# Patient Record
Sex: MALE | Race: WHITE | NOT HISPANIC OR LATINO | URBAN - METROPOLITAN AREA
[De-identification: names, ages, dates, MRNs, and addresses within clinical notes are randomized per-mention and may not be internally consistent; named-entity substitution may affect disease eponyms.]

---

## 2023-04-11 ENCOUNTER — TELEPHONE (OUTPATIENT)
Dept: PRIMARY CARE | Facility: CLINIC | Age: 83
End: 2023-04-11
Payer: MEDICARE

## 2023-04-11 DIAGNOSIS — E78.5 HYPERLIPIDEMIA, UNSPECIFIED HYPERLIPIDEMIA TYPE: Primary | ICD-10-CM

## 2023-04-11 DIAGNOSIS — E79.0 HYPERURICEMIA: ICD-10-CM

## 2023-04-11 RX ORDER — OXYBUTYNIN CHLORIDE 10 MG/1
1 TABLET, EXTENDED RELEASE ORAL DAILY
COMMUNITY
Start: 2022-07-14 | End: 2023-07-20 | Stop reason: SDUPTHER

## 2023-04-11 RX ORDER — BENZONATATE 200 MG/1
CAPSULE ORAL
COMMUNITY
Start: 2022-11-30 | End: 2023-07-20 | Stop reason: ALTCHOICE

## 2023-04-11 RX ORDER — FAMOTIDINE 20 MG/1
1 TABLET, FILM COATED ORAL DAILY
COMMUNITY
Start: 2021-03-22 | End: 2023-04-20 | Stop reason: SDUPTHER

## 2023-04-11 RX ORDER — GEMFIBROZIL 600 MG/1
TABLET, FILM COATED ORAL 2 TIMES DAILY
COMMUNITY
Start: 2017-10-18 | End: 2023-04-20 | Stop reason: SDUPTHER

## 2023-04-11 RX ORDER — KETOCONAZOLE 20 MG/ML
SHAMPOO, SUSPENSION TOPICAL
COMMUNITY
Start: 2021-01-11 | End: 2024-01-03 | Stop reason: SDUPTHER

## 2023-04-11 RX ORDER — ALBUTEROL SULFATE 90 UG/1
AEROSOL, METERED RESPIRATORY (INHALATION)
COMMUNITY
Start: 2022-11-30 | End: 2023-07-20 | Stop reason: ALTCHOICE

## 2023-04-11 RX ORDER — ALLOPURINOL 100 MG/1
1 TABLET ORAL DAILY
COMMUNITY
Start: 2016-08-16 | End: 2023-07-20 | Stop reason: SDUPTHER

## 2023-04-11 RX ORDER — POTASSIUM CITRATE 10 MEQ/1
1 TABLET, EXTENDED RELEASE ORAL 2 TIMES DAILY
COMMUNITY
Start: 2019-04-30 | End: 2023-04-20 | Stop reason: SDUPTHER

## 2023-04-11 RX ORDER — CALCIPOTRIENE 50 UG/G
CREAM TOPICAL
COMMUNITY
Start: 2022-06-08 | End: 2024-01-03 | Stop reason: SDUPTHER

## 2023-04-11 RX ORDER — GABAPENTIN 300 MG/1
1 CAPSULE ORAL 3 TIMES DAILY
COMMUNITY
Start: 2021-03-15 | End: 2023-04-20 | Stop reason: SDUPTHER

## 2023-04-11 RX ORDER — FLUTICASONE PROPIONATE 50 MCG
SPRAY, SUSPENSION (ML) NASAL
COMMUNITY
Start: 2020-02-03 | End: 2023-07-20 | Stop reason: ALTCHOICE

## 2023-04-11 NOTE — TELEPHONE ENCOUNTER
Patient coming in for a an appt coming up on 4/20 and would like bloodwork ordered.      Order in chart

## 2023-04-17 ENCOUNTER — LAB (OUTPATIENT)
Dept: LAB | Facility: LAB | Age: 83
End: 2023-04-17
Payer: MEDICARE

## 2023-04-17 DIAGNOSIS — E79.0 HYPERURICEMIA: ICD-10-CM

## 2023-04-17 DIAGNOSIS — E78.5 HYPERLIPIDEMIA, UNSPECIFIED HYPERLIPIDEMIA TYPE: ICD-10-CM

## 2023-04-17 LAB
ALANINE AMINOTRANSFERASE (SGPT) (U/L) IN SER/PLAS: 14 U/L (ref 10–52)
ALBUMIN (G/DL) IN SER/PLAS: 4.3 G/DL (ref 3.4–5)
ALKALINE PHOSPHATASE (U/L) IN SER/PLAS: 95 U/L (ref 33–136)
ANION GAP IN SER/PLAS: 14 MMOL/L (ref 10–20)
ASPARTATE AMINOTRANSFERASE (SGOT) (U/L) IN SER/PLAS: 15 U/L (ref 9–39)
BASOPHILS (10*3/UL) IN BLOOD BY AUTOMATED COUNT: 0.04 X10E9/L (ref 0–0.1)
BASOPHILS/100 LEUKOCYTES IN BLOOD BY AUTOMATED COUNT: 0.5 % (ref 0–2)
BILIRUBIN TOTAL (MG/DL) IN SER/PLAS: 0.6 MG/DL (ref 0–1.2)
CALCIUM (MG/DL) IN SER/PLAS: 9.5 MG/DL (ref 8.6–10.3)
CARBON DIOXIDE, TOTAL (MMOL/L) IN SER/PLAS: 23 MMOL/L (ref 21–32)
CHLORIDE (MMOL/L) IN SER/PLAS: 105 MMOL/L (ref 98–107)
CHOLESTEROL (MG/DL) IN SER/PLAS: 149 MG/DL (ref 0–199)
CHOLESTEROL IN HDL (MG/DL) IN SER/PLAS: 30.4 MG/DL
CHOLESTEROL/HDL RATIO: 4.9
CREATININE (MG/DL) IN SER/PLAS: 1.45 MG/DL (ref 0.5–1.3)
EOSINOPHILS (10*3/UL) IN BLOOD BY AUTOMATED COUNT: 0.28 X10E9/L (ref 0–0.4)
EOSINOPHILS/100 LEUKOCYTES IN BLOOD BY AUTOMATED COUNT: 3.3 % (ref 0–6)
ERYTHROCYTE DISTRIBUTION WIDTH (RATIO) BY AUTOMATED COUNT: 13.7 % (ref 11.5–14.5)
ERYTHROCYTE MEAN CORPUSCULAR HEMOGLOBIN CONCENTRATION (G/DL) BY AUTOMATED: 33.2 G/DL (ref 32–36)
ERYTHROCYTE MEAN CORPUSCULAR VOLUME (FL) BY AUTOMATED COUNT: 89 FL (ref 80–100)
ERYTHROCYTES (10*6/UL) IN BLOOD BY AUTOMATED COUNT: 4.82 X10E12/L (ref 4.5–5.9)
GFR MALE: 48 ML/MIN/1.73M2
GLUCOSE (MG/DL) IN SER/PLAS: 112 MG/DL (ref 74–99)
HEMATOCRIT (%) IN BLOOD BY AUTOMATED COUNT: 43.1 % (ref 41–52)
HEMOGLOBIN (G/DL) IN BLOOD: 14.3 G/DL (ref 13.5–17.5)
IMMATURE GRANULOCYTES/100 LEUKOCYTES IN BLOOD BY AUTOMATED COUNT: 0.4 % (ref 0–0.9)
LDL: 72 MG/DL (ref 0–99)
LEUKOCYTES (10*3/UL) IN BLOOD BY AUTOMATED COUNT: 8.5 X10E9/L (ref 4.4–11.3)
LYMPHOCYTES (10*3/UL) IN BLOOD BY AUTOMATED COUNT: 1.24 X10E9/L (ref 0.8–3)
LYMPHOCYTES/100 LEUKOCYTES IN BLOOD BY AUTOMATED COUNT: 14.6 % (ref 13–44)
MONOCYTES (10*3/UL) IN BLOOD BY AUTOMATED COUNT: 0.65 X10E9/L (ref 0.05–0.8)
MONOCYTES/100 LEUKOCYTES IN BLOOD BY AUTOMATED COUNT: 7.7 % (ref 2–10)
NEUTROPHILS (10*3/UL) IN BLOOD BY AUTOMATED COUNT: 6.24 X10E9/L (ref 1.6–5.5)
NEUTROPHILS/100 LEUKOCYTES IN BLOOD BY AUTOMATED COUNT: 73.5 % (ref 40–80)
NON HDL CHOLESTEROL: 119 MG/DL
PLATELETS (10*3/UL) IN BLOOD AUTOMATED COUNT: 256 X10E9/L (ref 150–450)
POTASSIUM (MMOL/L) IN SER/PLAS: 3.8 MMOL/L (ref 3.5–5.3)
PROTEIN TOTAL: 7.5 G/DL (ref 6.4–8.2)
SODIUM (MMOL/L) IN SER/PLAS: 138 MMOL/L (ref 136–145)
TRIGLYCERIDE (MG/DL) IN SER/PLAS: 232 MG/DL (ref 0–149)
URATE (MG/DL) IN SER/PLAS: 6.5 MG/DL (ref 4–7.5)
UREA NITROGEN (MG/DL) IN SER/PLAS: 34 MG/DL (ref 6–23)
VLDL: 46 MG/DL (ref 0–40)

## 2023-04-17 PROCEDURE — 84550 ASSAY OF BLOOD/URIC ACID: CPT

## 2023-04-17 PROCEDURE — 36415 COLL VENOUS BLD VENIPUNCTURE: CPT

## 2023-04-17 PROCEDURE — 85025 COMPLETE CBC W/AUTO DIFF WBC: CPT

## 2023-04-17 PROCEDURE — 80061 LIPID PANEL: CPT

## 2023-04-17 PROCEDURE — 80053 COMPREHEN METABOLIC PANEL: CPT

## 2023-04-20 ENCOUNTER — OFFICE VISIT (OUTPATIENT)
Dept: PRIMARY CARE | Facility: CLINIC | Age: 83
End: 2023-04-20
Payer: MEDICARE

## 2023-04-20 VITALS
HEART RATE: 73 BPM | SYSTOLIC BLOOD PRESSURE: 138 MMHG | DIASTOLIC BLOOD PRESSURE: 73 MMHG | OXYGEN SATURATION: 82 % | BODY MASS INDEX: 26.85 KG/M2 | TEMPERATURE: 97.5 F | WEIGHT: 171.4 LBS

## 2023-04-20 DIAGNOSIS — E11.65 TYPE 2 DIABETES MELLITUS WITH HYPERGLYCEMIA, WITHOUT LONG-TERM CURRENT USE OF INSULIN (MULTI): ICD-10-CM

## 2023-04-20 DIAGNOSIS — K21.9 GASTROESOPHAGEAL REFLUX DISEASE WITHOUT ESOPHAGITIS: ICD-10-CM

## 2023-04-20 DIAGNOSIS — N20.0 KIDNEY STONES: ICD-10-CM

## 2023-04-20 DIAGNOSIS — C61 MALIGNANT NEOPLASM OF PROSTATE (MULTI): ICD-10-CM

## 2023-04-20 DIAGNOSIS — E78.5 HYPERLIPIDEMIA, UNSPECIFIED HYPERLIPIDEMIA TYPE: ICD-10-CM

## 2023-04-20 DIAGNOSIS — Z00.00 ROUTINE GENERAL MEDICAL EXAMINATION AT HEALTH CARE FACILITY: Primary | ICD-10-CM

## 2023-04-20 DIAGNOSIS — N18.5 CHRONIC KIDNEY DISEASE, STAGE 5 (MULTI): ICD-10-CM

## 2023-04-20 DIAGNOSIS — G63 POLYNEUROPATHY ASSOCIATED WITH UNDERLYING DISEASE (MULTI): ICD-10-CM

## 2023-04-20 DIAGNOSIS — Z00.00 MEDICARE ANNUAL WELLNESS VISIT, SUBSEQUENT: ICD-10-CM

## 2023-04-20 PROBLEM — N18.32 CHRONIC KIDNEY DISEASE, STAGE 3B (MULTI): Status: ACTIVE | Noted: 2023-04-20

## 2023-04-20 PROBLEM — G62.9 PERIPHERAL NEUROPATHY: Status: ACTIVE | Noted: 2023-04-20

## 2023-04-20 PROBLEM — N52.35 ERECTILE DYSFUNCTION FOLLOWING RADIATION THERAPY: Status: ACTIVE | Noted: 2023-04-20

## 2023-04-20 PROBLEM — I10 ESSENTIAL HYPERTENSION: Status: ACTIVE | Noted: 2023-04-20

## 2023-04-20 PROBLEM — E78.1 ESSENTIAL HYPERTRIGLYCERIDEMIA: Status: ACTIVE | Noted: 2023-04-20

## 2023-04-20 PROBLEM — M10.9 GOUT: Status: ACTIVE | Noted: 2023-04-20

## 2023-04-20 PROCEDURE — 99214 OFFICE O/P EST MOD 30 MIN: CPT | Performed by: FAMILY MEDICINE

## 2023-04-20 PROCEDURE — G0439 PPPS, SUBSEQ VISIT: HCPCS | Performed by: FAMILY MEDICINE

## 2023-04-20 PROCEDURE — 1036F TOBACCO NON-USER: CPT | Performed by: FAMILY MEDICINE

## 2023-04-20 PROCEDURE — 1159F MED LIST DOCD IN RCRD: CPT | Performed by: FAMILY MEDICINE

## 2023-04-20 PROCEDURE — 1157F ADVNC CARE PLAN IN RCRD: CPT | Performed by: FAMILY MEDICINE

## 2023-04-20 PROCEDURE — 3075F SYST BP GE 130 - 139MM HG: CPT | Performed by: FAMILY MEDICINE

## 2023-04-20 PROCEDURE — 3078F DIAST BP <80 MM HG: CPT | Performed by: FAMILY MEDICINE

## 2023-04-20 PROCEDURE — 1170F FXNL STATUS ASSESSED: CPT | Performed by: FAMILY MEDICINE

## 2023-04-20 PROCEDURE — 1160F RVW MEDS BY RX/DR IN RCRD: CPT | Performed by: FAMILY MEDICINE

## 2023-04-20 RX ORDER — GEMFIBROZIL 600 MG/1
600 TABLET, FILM COATED ORAL 2 TIMES DAILY
Qty: 180 TABLET | Refills: 1 | Status: SHIPPED | OUTPATIENT
Start: 2023-04-20 | End: 2023-07-20 | Stop reason: ALTCHOICE

## 2023-04-20 RX ORDER — GABAPENTIN 300 MG/1
CAPSULE ORAL
Qty: 120 CAPSULE | Refills: 2 | Status: SHIPPED | OUTPATIENT
Start: 2023-04-20 | End: 2023-07-20 | Stop reason: SDUPTHER

## 2023-04-20 RX ORDER — BLOOD-GLUCOSE METER
EACH MISCELLANEOUS
Qty: 100 STRIP | Refills: 3 | Status: SHIPPED | OUTPATIENT
Start: 2023-04-20 | End: 2023-07-20 | Stop reason: SDUPTHER

## 2023-04-20 RX ORDER — FAMOTIDINE 20 MG/1
20 TABLET, FILM COATED ORAL DAILY
Qty: 90 TABLET | Refills: 1 | Status: SHIPPED | OUTPATIENT
Start: 2023-04-20 | End: 2023-07-20 | Stop reason: SDUPTHER

## 2023-04-20 RX ORDER — BLOOD-GLUCOSE CONTROL, NORMAL
EACH MISCELLANEOUS
Qty: 100 EACH | Refills: 3 | Status: SHIPPED | OUTPATIENT
Start: 2023-04-20 | End: 2023-10-05 | Stop reason: SDUPTHER

## 2023-04-20 RX ORDER — POTASSIUM CITRATE 10 MEQ/1
10 TABLET, EXTENDED RELEASE ORAL 2 TIMES DAILY
Qty: 180 TABLET | Refills: 1 | Status: SHIPPED | OUTPATIENT
Start: 2023-04-20 | End: 2023-07-20 | Stop reason: SDUPTHER

## 2023-04-20 RX ORDER — BLOOD-GLUCOSE METER
EACH MISCELLANEOUS
COMMUNITY
Start: 2023-02-24 | End: 2023-04-20 | Stop reason: SDUPTHER

## 2023-04-20 RX ORDER — LANCETS 33 GAUGE
EACH MISCELLANEOUS DAILY
COMMUNITY
Start: 2023-02-24 | End: 2023-04-20 | Stop reason: SDUPTHER

## 2023-04-20 RX ORDER — LANCETS 30 GAUGE
EACH MISCELLANEOUS
COMMUNITY
Start: 2023-02-24

## 2023-04-20 ASSESSMENT — PATIENT HEALTH QUESTIONNAIRE - PHQ9
1. LITTLE INTEREST OR PLEASURE IN DOING THINGS: NOT AT ALL
2. FEELING DOWN, DEPRESSED OR HOPELESS: NOT AT ALL
SUM OF ALL RESPONSES TO PHQ9 QUESTIONS 1 AND 2: 0

## 2023-04-20 ASSESSMENT — ACTIVITIES OF DAILY LIVING (ADL)
TAKING_MEDICATION: INDEPENDENT
BATHING: INDEPENDENT
MANAGING_FINANCES: INDEPENDENT
GROCERY_SHOPPING: INDEPENDENT
DOING_HOUSEWORK: INDEPENDENT
DRESSING: INDEPENDENT

## 2023-04-20 ASSESSMENT — ENCOUNTER SYMPTOMS
DEPRESSION: 0
LOSS OF SENSATION IN FEET: 0
OCCASIONAL FEELINGS OF UNSTEADINESS: 0

## 2023-04-20 NOTE — PROGRESS NOTES
Subjective   Reason for Visit: Chilo Gibbs is an 83 y.o. male here for a Medicare Wellness visit.     Past Medical, Surgical, and Family History reviewed and updated in chart.    Reviewed all medications by prescribing practitioner or clinical pharmacist (such as prescriptions, OTCs, herbal therapies and supplements) and documented in the medical record.  Medicare Wellness Billing Compliance Satisfied    Review all medications by prescribing practitioner or clinical pharmacist (such as prescriptions, OTCs, herbal therapies and supplements) documented in the medical record    Past Medical, Surgical, and Family History reviewed and updated in chart    Tobacco Use Reviewed    Alcohol Use Reviewed    Illicit Drug Use Reviewed    PHQ2/9    Falls in Last Year Reviewed    Home Safety Risk Factors Reviewed    Cognitive Impairment Reviewed    Patient Self Assessment and Health Status    Current Diet Reviewed    Exercise Frequency    ADL - Hearing Impairment    ADL - Bathing    ADL - Dressing    ADL - Walks in Home    IADL - Managing Finances    IADL - Grocery Shopping    IADL - Taking Medications    IADL - Doing Housework      HPI  He takes his gemfibrozil at lunch which resolved previous issues with his stomach and bowel movements. He also takes it before dinner. His TG are the best they have been.  Wife is with him, states they are eating a lot of produce and much less processed foods.  Helping BS and TG    He requests refills on famotidine, gabapentin, gemfibrozil and potassium citrate as they are controlling his symptoms well. He does not voice any side effects.     He has been losing weight. He tests his blood sugar for FBS and after dinner. He has been working with a dietician. He has significantly reduced sugar intake. BS has improved     He complains of neuropathy at night. He notes pain in his feet. He notes the Gabapentin provides minimal relief. Will increase gabapentin to 4 per day. He has  a history of gout and uric acid good range. He had plates put in each big toe. He wonders if this has contributed to his nerve pain.     He requests a renewal of his disability placard.     Patient Care Team:  Sandra Daniel DO as PCP - General  Sandra Daniel DO as PCP - [employer] PCP     Review of Systems    Objective   Vitals:  There were no vitals taken for this visit.      Physical Exam    Assessment/Plan   Problem List Items Addressed This Visit      Reviewed Labs from 04/17/2023, which  revealed blood count WNL.  Chemistries in good range with FBS of 112. Total Cholesterol 149, HDL 30, LDL 72, Triglycerides 232    Uric Acid  6.5        Genitourinary    Chronic kidney disease, stage 5 (CMS/[facility])    Malignant neoplasm of prostate (CMS/[facility])     Other Visit Diagnoses       Routine [facility] medical examination at [facility]    -  Primary Medicare annual wellness visit, subsequent            GERD  Continue on Famotidine 20 mg daily. Prescription sent to pharmacy.     Hyperlipidemia  Continue on gemfibrozil 600 mg twice daily. Prescription sent to pharmacy.     Back Pain and Neuropathy.   Increase  Gabapentin 300 mg to four times a day. Prescription sent to pharmacy,   OARRS checked. Risk and benefit ratio assessed. No Suspicious activity.     Diabetes   Monitor blood sugar once daily. Prescription for test strips and lancet sent to pharmacy.  Continue to work on diet and exercise.      Health Maintenance  Provided order for repeat labs in 3 months. Will call with results.   Provided disability placard for neuropathy.     Medication refill.   Continue on Potassium Citrate 10 meq daily. Prescription sent to pharmacy.     Follow up in 3-4 months, with labs prior, unless something is needed sooner.      Scribe Attestation  By signing my name below, IAntoinette Scribe   attest that this documentation has been prepared under the direction and in the presence of Sandra  DO María.

## 2023-06-09 DIAGNOSIS — I10 ESSENTIAL HYPERTENSION: Primary | ICD-10-CM

## 2023-06-09 RX ORDER — AMLODIPINE BESYLATE 5 MG/1
5 TABLET ORAL DAILY
Qty: 90 TABLET | Refills: 0 | Status: SHIPPED | OUTPATIENT
Start: 2023-06-09 | End: 2023-07-20 | Stop reason: SDUPTHER

## 2023-06-09 RX ORDER — AMLODIPINE BESYLATE 5 MG/1
1 TABLET ORAL DAILY
COMMUNITY
Start: 2019-04-29 | End: 2023-06-09 | Stop reason: SDUPTHER

## 2023-07-05 ENCOUNTER — TELEPHONE (OUTPATIENT)
Dept: PRIMARY CARE | Facility: CLINIC | Age: 83
End: 2023-07-05
Payer: MEDICARE

## 2023-07-06 ENCOUNTER — LAB (OUTPATIENT)
Dept: LAB | Facility: LAB | Age: 83
End: 2023-07-06
Payer: MEDICARE

## 2023-07-06 DIAGNOSIS — E78.5 HYPERLIPIDEMIA, UNSPECIFIED HYPERLIPIDEMIA TYPE: ICD-10-CM

## 2023-07-06 DIAGNOSIS — E11.65 TYPE 2 DIABETES MELLITUS WITH HYPERGLYCEMIA, WITHOUT LONG-TERM CURRENT USE OF INSULIN (MULTI): ICD-10-CM

## 2023-07-06 DIAGNOSIS — C61 MALIGNANT NEOPLASM OF PROSTATE (MULTI): ICD-10-CM

## 2023-07-06 LAB
ALANINE AMINOTRANSFERASE (SGPT) (U/L) IN SER/PLAS: 16 U/L (ref 10–52)
ALBUMIN (G/DL) IN SER/PLAS: 4.6 G/DL (ref 3.4–5)
ALKALINE PHOSPHATASE (U/L) IN SER/PLAS: 117 U/L (ref 33–136)
ANION GAP IN SER/PLAS: 14 MMOL/L (ref 10–20)
ASPARTATE AMINOTRANSFERASE (SGOT) (U/L) IN SER/PLAS: 17 U/L (ref 9–39)
BILIRUBIN TOTAL (MG/DL) IN SER/PLAS: 0.7 MG/DL (ref 0–1.2)
CALCIUM (MG/DL) IN SER/PLAS: 10 MG/DL (ref 8.6–10.3)
CARBON DIOXIDE, TOTAL (MMOL/L) IN SER/PLAS: 25 MMOL/L (ref 21–32)
CHLORIDE (MMOL/L) IN SER/PLAS: 104 MMOL/L (ref 98–107)
CHOLESTEROL (MG/DL) IN SER/PLAS: 169 MG/DL (ref 0–199)
CHOLESTEROL IN HDL (MG/DL) IN SER/PLAS: 35.4 MG/DL
CHOLESTEROL/HDL RATIO: 4.8
CREATININE (MG/DL) IN SER/PLAS: 1.63 MG/DL (ref 0.5–1.3)
ESTIMATED AVERAGE GLUCOSE FOR HBA1C: 134 MG/DL
GFR MALE: 42 ML/MIN/1.73M2
GLUCOSE (MG/DL) IN SER/PLAS: 97 MG/DL (ref 74–99)
HEMOGLOBIN A1C/HEMOGLOBIN TOTAL IN BLOOD: 6.3 %
LDL: 98 MG/DL (ref 0–99)
POTASSIUM (MMOL/L) IN SER/PLAS: 4 MMOL/L (ref 3.5–5.3)
PROSTATE SPECIFIC ANTIGEN,SCREEN: <0.01 NG/ML (ref 0–4)
PROTEIN TOTAL: 7.7 G/DL (ref 6.4–8.2)
SODIUM (MMOL/L) IN SER/PLAS: 139 MMOL/L (ref 136–145)
TRIGLYCERIDE (MG/DL) IN SER/PLAS: 178 MG/DL (ref 0–149)
UREA NITROGEN (MG/DL) IN SER/PLAS: 40 MG/DL (ref 6–23)
VLDL: 36 MG/DL (ref 0–40)

## 2023-07-06 PROCEDURE — 84153 ASSAY OF PSA TOTAL: CPT

## 2023-07-06 PROCEDURE — 80061 LIPID PANEL: CPT

## 2023-07-06 PROCEDURE — 80053 COMPREHEN METABOLIC PANEL: CPT

## 2023-07-06 PROCEDURE — 83036 HEMOGLOBIN GLYCOSYLATED A1C: CPT

## 2023-07-06 PROCEDURE — 36415 COLL VENOUS BLD VENIPUNCTURE: CPT

## 2023-07-20 ENCOUNTER — OFFICE VISIT (OUTPATIENT)
Dept: PRIMARY CARE | Facility: CLINIC | Age: 83
End: 2023-07-20
Payer: MEDICARE

## 2023-07-20 VITALS
RESPIRATION RATE: 16 BRPM | HEART RATE: 63 BPM | WEIGHT: 165 LBS | OXYGEN SATURATION: 92 % | HEIGHT: 67 IN | DIASTOLIC BLOOD PRESSURE: 75 MMHG | BODY MASS INDEX: 25.9 KG/M2 | SYSTOLIC BLOOD PRESSURE: 135 MMHG | TEMPERATURE: 97.2 F

## 2023-07-20 DIAGNOSIS — N13.8 BPH WITH OBSTRUCTION/LOWER URINARY TRACT SYMPTOMS: ICD-10-CM

## 2023-07-20 DIAGNOSIS — M1A.09X0 CHRONIC GOUT OF MULTIPLE SITES, UNSPECIFIED CAUSE: Primary | ICD-10-CM

## 2023-07-20 DIAGNOSIS — N18.2 DIABETES MELLITUS DUE TO UNDERLYING CONDITION WITH STAGE 2 CHRONIC KIDNEY DISEASE, WITHOUT LONG-TERM CURRENT USE OF INSULIN (MULTI): ICD-10-CM

## 2023-07-20 DIAGNOSIS — G63 POLYNEUROPATHY ASSOCIATED WITH UNDERLYING DISEASE (MULTI): ICD-10-CM

## 2023-07-20 DIAGNOSIS — E78.1 ESSENTIAL HYPERTRIGLYCERIDEMIA: ICD-10-CM

## 2023-07-20 DIAGNOSIS — K21.9 GASTROESOPHAGEAL REFLUX DISEASE WITHOUT ESOPHAGITIS: ICD-10-CM

## 2023-07-20 DIAGNOSIS — N40.1 BPH WITH OBSTRUCTION/LOWER URINARY TRACT SYMPTOMS: ICD-10-CM

## 2023-07-20 DIAGNOSIS — N18.32 CHRONIC KIDNEY DISEASE, STAGE 3B (MULTI): ICD-10-CM

## 2023-07-20 DIAGNOSIS — E11.65 TYPE 2 DIABETES MELLITUS WITH HYPERGLYCEMIA, WITHOUT LONG-TERM CURRENT USE OF INSULIN (MULTI): ICD-10-CM

## 2023-07-20 DIAGNOSIS — I10 ESSENTIAL HYPERTENSION: ICD-10-CM

## 2023-07-20 DIAGNOSIS — E08.22 DIABETES MELLITUS DUE TO UNDERLYING CONDITION WITH STAGE 2 CHRONIC KIDNEY DISEASE, WITHOUT LONG-TERM CURRENT USE OF INSULIN (MULTI): ICD-10-CM

## 2023-07-20 DIAGNOSIS — N20.0 KIDNEY STONES: ICD-10-CM

## 2023-07-20 DIAGNOSIS — C61 MALIGNANT NEOPLASM OF PROSTATE (MULTI): ICD-10-CM

## 2023-07-20 DIAGNOSIS — L21.9 SEBORRHEA: ICD-10-CM

## 2023-07-20 PROBLEM — M25.571 ANKLE PAIN, RIGHT: Status: ACTIVE | Noted: 2023-07-20

## 2023-07-20 PROBLEM — M67.819 TENDINOSIS OF ROTATOR CUFF: Status: ACTIVE | Noted: 2023-07-20

## 2023-07-20 PROBLEM — N28.9 RENAL INSUFFICIENCY: Status: ACTIVE | Noted: 2023-07-20

## 2023-07-20 PROBLEM — L40.9 PSORIASIS: Status: ACTIVE | Noted: 2023-07-20

## 2023-07-20 PROBLEM — R31.29 MICROSCOPIC HEMATURIA: Status: ACTIVE | Noted: 2023-07-20

## 2023-07-20 PROBLEM — M1A.00X1: Status: ACTIVE | Noted: 2023-07-20

## 2023-07-20 PROBLEM — R14.0 ABDOMINAL BLOATING: Status: ACTIVE | Noted: 2023-07-20

## 2023-07-20 PROBLEM — M48.062 SPINAL STENOSIS OF LUMBAR REGION WITH NEUROGENIC CLAUDICATION: Status: ACTIVE | Noted: 2023-07-20

## 2023-07-20 PROBLEM — M19.279 SECONDARY LOCALIZED OSTEOARTHROSIS OF ANKLE AND FOOT: Status: ACTIVE | Noted: 2023-07-20

## 2023-07-20 PROBLEM — M19.071 ARTHRITIS OF RIGHT SUBTALAR JOINT: Status: ACTIVE | Noted: 2023-07-20

## 2023-07-20 PROBLEM — R05.1 ACUTE COUGH: Status: ACTIVE | Noted: 2023-07-20

## 2023-07-20 PROBLEM — R19.5 LOOSE STOOLS: Status: ACTIVE | Noted: 2023-07-20

## 2023-07-20 PROBLEM — R79.89 ELEVATED PARATHYROID HORMONE: Status: ACTIVE | Noted: 2023-07-20

## 2023-07-20 PROBLEM — M25.519 SHOULDER PAIN: Status: ACTIVE | Noted: 2023-07-20

## 2023-07-20 PROBLEM — J20.9 ACUTE BRONCHITIS: Status: ACTIVE | Noted: 2023-07-20

## 2023-07-20 PROBLEM — R19.4 CHANGE IN BOWEL HABIT: Status: ACTIVE | Noted: 2023-07-20

## 2023-07-20 PROBLEM — M17.0 ARTHRITIS OF BOTH KNEES: Status: ACTIVE | Noted: 2023-07-20

## 2023-07-20 PROBLEM — R39.15 URINARY URGENCY: Status: ACTIVE | Noted: 2023-07-20

## 2023-07-20 PROBLEM — H90.3 SENSORINEURAL HEARING LOSS, BILATERAL: Status: ACTIVE | Noted: 2023-07-20

## 2023-07-20 PROBLEM — R10.32 LEFT LOWER QUADRANT ABDOMINAL PAIN: Status: ACTIVE | Noted: 2023-07-20

## 2023-07-20 PROCEDURE — 1157F ADVNC CARE PLAN IN RCRD: CPT | Performed by: FAMILY MEDICINE

## 2023-07-20 PROCEDURE — 1126F AMNT PAIN NOTED NONE PRSNT: CPT | Performed by: FAMILY MEDICINE

## 2023-07-20 PROCEDURE — 1160F RVW MEDS BY RX/DR IN RCRD: CPT | Performed by: FAMILY MEDICINE

## 2023-07-20 PROCEDURE — 3075F SYST BP GE 130 - 139MM HG: CPT | Performed by: FAMILY MEDICINE

## 2023-07-20 PROCEDURE — 1159F MED LIST DOCD IN RCRD: CPT | Performed by: FAMILY MEDICINE

## 2023-07-20 PROCEDURE — 3078F DIAST BP <80 MM HG: CPT | Performed by: FAMILY MEDICINE

## 2023-07-20 PROCEDURE — 99214 OFFICE O/P EST MOD 30 MIN: CPT | Performed by: FAMILY MEDICINE

## 2023-07-20 PROCEDURE — 1036F TOBACCO NON-USER: CPT | Performed by: FAMILY MEDICINE

## 2023-07-20 RX ORDER — POTASSIUM CITRATE 10 MEQ/1
10 TABLET, EXTENDED RELEASE ORAL 2 TIMES DAILY
Qty: 180 TABLET | Refills: 1 | Status: SHIPPED | OUTPATIENT
Start: 2023-07-20 | End: 2023-10-05 | Stop reason: SDUPTHER

## 2023-07-20 RX ORDER — OXYBUTYNIN CHLORIDE 10 MG/1
10 TABLET, EXTENDED RELEASE ORAL DAILY
Qty: 90 TABLET | Refills: 1 | Status: SHIPPED | OUTPATIENT
Start: 2023-07-20 | End: 2023-10-05 | Stop reason: SDUPTHER

## 2023-07-20 RX ORDER — GABAPENTIN 300 MG/1
CAPSULE ORAL
Qty: 120 CAPSULE | Refills: 2 | Status: SHIPPED | OUTPATIENT
Start: 2023-07-20 | End: 2023-10-05 | Stop reason: SDUPTHER

## 2023-07-20 RX ORDER — BLOOD-GLUCOSE METER
EACH MISCELLANEOUS
Qty: 100 STRIP | Refills: 3 | Status: SHIPPED | OUTPATIENT
Start: 2023-07-20 | End: 2023-10-05 | Stop reason: SDUPTHER

## 2023-07-20 RX ORDER — TAMSULOSIN HYDROCHLORIDE 0.4 MG/1
1 CAPSULE ORAL NIGHTLY
Qty: 30 CAPSULE | Refills: 11 | COMMUNITY
Start: 2023-02-21 | End: 2023-07-20 | Stop reason: ALTCHOICE

## 2023-07-20 RX ORDER — FAMOTIDINE 20 MG/1
20 TABLET, FILM COATED ORAL DAILY
Qty: 90 TABLET | Refills: 1 | Status: SHIPPED | OUTPATIENT
Start: 2023-07-20 | End: 2023-10-05 | Stop reason: SDUPTHER

## 2023-07-20 RX ORDER — ALLOPURINOL 100 MG/1
100 TABLET ORAL DAILY
Qty: 90 TABLET | Refills: 1 | Status: SHIPPED | OUTPATIENT
Start: 2023-07-20 | End: 2023-10-05 | Stop reason: SDUPTHER

## 2023-07-20 RX ORDER — CLOBETASOL PROPIONATE 0.5 MG/G
OINTMENT TOPICAL 2 TIMES DAILY
COMMUNITY
Start: 2023-04-11 | End: 2024-01-03 | Stop reason: SDUPTHER

## 2023-07-20 RX ORDER — AMLODIPINE BESYLATE 5 MG/1
5 TABLET ORAL DAILY
Qty: 90 TABLET | Refills: 1 | Status: SHIPPED | OUTPATIENT
Start: 2023-07-20 | End: 2023-08-22 | Stop reason: SDUPTHER

## 2023-07-20 RX ORDER — PANTOPRAZOLE SODIUM 40 MG/1
40 TABLET, DELAYED RELEASE ORAL DAILY
COMMUNITY
End: 2023-07-20 | Stop reason: ALTCHOICE

## 2023-07-20 RX ORDER — TAPINAROF 10 MG/1000MG
CREAM TOPICAL
COMMUNITY
Start: 2023-01-25 | End: 2023-07-20 | Stop reason: ALTCHOICE

## 2023-07-20 RX ORDER — CLOBETASOL PROPIONATE 0.46 MG/ML
SOLUTION TOPICAL DAILY
COMMUNITY
Start: 2023-04-11 | End: 2024-01-03 | Stop reason: SDUPTHER

## 2023-07-20 ASSESSMENT — PAIN SCALES - GENERAL: PAINLEVEL: 0-NO PAIN

## 2023-07-20 NOTE — PATIENT INSTRUCTIONS
Gout  Continue on allopurinol 100 mg daily. Prescription sent to pharmacy.     GERD  Continue on famotidine 20 mg daily. Prescription sent to pharmacy.     BPH with obstruction  Continue on oxybutynin 10 mg daily. Prescription sent to pharmacy.     Psoriasis  Continue using ketoconazole 2% shampoo. Prescription sent to pharmacy.     Diabetes Mellitus  Continue to use One Touch test strips to monitor glucose. Prescription sent to pharmacy.     Peripheral Neuropathy  Continue on gabapentin 300 mg daily. Prescription sent to pharmacy.   OARRS checked. Risk and benefit ratio assessed. No Suspicious activity.     Kidney Stone History   Continue on potassium citrate 10 meq daily. Prescription sent to pharmacy.     Hypertension   Continue on amlodipine 5 mg daily. Prescription sent to pharmacy.     Health Maintenance  Provided order for labs with fasting. Will call with results.

## 2023-07-20 NOTE — PROGRESS NOTES
"Subjective   Patient ID: Chilo Gibbs is a 83 y.o. male who presents for 3 MONTH F/UP.    HPI   Since being diagnosed with diabetes. He has drastically changed his diet and lost 15 pounds. He walks twice daily after lunch and dinner. He has been keeping a log of his blood sugar readings. He brought in the log today. FBS 92 to 120    He had prostate cancer in 2011. He no longer has a specialist following his case. PSA has remained low.  He sees urologist, on Detrol and potassium citrate for previous kidney stones    He continues on allopurinol, amlodipine, gabapentin, ketoconazole shampoo, glucose test strips, potassium citrate and oxybutynin as they are controlling symptoms well. He does not voice any side effects. BP is well controlled,  no cp or sob.  Able to walk for exercise without problem    He complains that his feet have been a problem. He feels his feet are hot or cold at night, and it keeps him awake at times. He had trouble walking without foot pain. He bought Matta shoes and orthotic inserts OTC. He has noticed great improvement and walks twice daily outside everyday.  He is using gabapentin to help these symptoms and helps most of time.  He was offered injections in his back also and is considering doing it later in the year.     Review of Systems    Objective   /75   Pulse 63   Temp 36.2 °C (97.2 °F)   Resp 16   Ht 1.702 m (5' 7\")   Wt 74.8 kg (165 lb)   SpO2 92%   BMI 25.84 kg/m²     Physical Exam  Constitutional:       Appearance: Normal appearance.   HENT:      Head: Normocephalic.   Neck:      Vascular: No carotid bruit.   Cardiovascular:      Rate and Rhythm: Normal rate and regular rhythm.      Pulses: Normal pulses.      Heart sounds: Normal heart sounds.   Pulmonary:      Effort: Pulmonary effort is normal.      Breath sounds: Normal breath sounds.   Musculoskeletal:         General: Normal range of motion.      Cervical back: Normal range of motion.      Right lower leg: No " edema.      Left lower leg: No edema.   Lymphadenopathy:      Cervical: No cervical adenopathy.   Skin:     General: Skin is warm and dry.   Neurological:      Mental Status: He is alert and oriented to person, place, and time.      Gait: Gait normal.   Psychiatric:         Mood and Affect: Mood normal.         Thought Content: Thought content normal.         Judgment: Judgment normal.         Assessment/Plan   Reviewed Labs from 07/06/2023, which  revealed blood count WNL.    Chemistries in good range with FBS of 97.   Total Cholesterol 169, HDL 35.4, LDL 98, Triglycerides 178  A1C 6.3       Gout  Continue on allopurinol 100 mg daily. Prescription sent to pharmacy.     GERD  Continue on famotidine 20 mg daily. Prescription sent to pharmacy.     BPH with obstruction  Continue on oxybutynin 10 mg daily. Prescription sent to pharmacy.     Psoriasis  Continue using ketoconazole 2% shampoo. Prescription sent to pharmacy. Pt is scheduling w dermatology    Diabetes Mellitus  Continue to use One Touch test strips to monitor glucose. Prescription sent to pharmacy. Continue diet and exercise changes, doing well.      Peripheral Neuropathy  Continue on gabapentin 300 mg daily. Prescription sent to pharmacy.   OARRS checked. Risk and benefit ratio assessed. No Suspicious activity.     Kidney Stone History   Continue on potassium citrate 10 meq daily. Prescription sent to pharmacy.     Hypertension   Continue on amlodipine 5 mg daily. Prescription sent to pharmacy.     Health Maintenance  Provided order for labs with fasting. Will repeat lab prior to next 3 month visit.  Discussed possible low dose statin to get LDL to goal.     Follow up in 3 months, unless a visit is needed sooner     Problem List Items Addressed This Visit       Malignant neoplasm of prostate (CMS/[facility])     Prostate cancer dx 2012 and treated.  PSA done with labs.  Assess yearly         Chronic kidney disease, stage 3b (CMS/[facility])     Sees nephrology regularly.  CKD stable.           Essential hypertension    Relevant Medications    amLODIPine (Norvasc) 5 mg tablet    Other Relevant Orders    Comprehensive Metabolic Panel    Essential hypertriglyceridemia    GERD (gastroesophageal reflux disease)    Relevant Medications    famotidine (Pepcid) 20 mg tablet    Gout - Primary    Relevant Medications    allopurinol (Zyloprim) 100 mg tablet    Peripheral neuropathy    Relevant Medications    gabapentin (Neurontin) 300 mg capsule    BPH with obstruction/lower urinary tract symptoms    Relevant Medications    oxybutynin XL (Ditropan-XL) 10 mg 24 hr tablet    Diabetes mellitus due to underlying condition with stage 2 chronic kidney disease (CMS/HCC)     Controlling with diet and exercise.  Much improved.  Reassess 6 months          Other Visit Diagnoses       Type 2 diabetes mellitus with hyperglycemia, without long-term current use of insulin (CMS/HCC)        Relevant Medications    OneTouch Verio test strips strip    Other Relevant Orders    Lipid Panel    Hemoglobin A1C    Kidney stones        Relevant Medications    potassium citrate CR (Urocit-K-10) 10 mEq ER tablet    Seborrhea               Scribe Attestation  By signing my name below, IAntoinette Scribe   attest that this documentation has been prepared under the direction and in the presence of Sandra Daniel DO.

## 2023-07-21 DIAGNOSIS — E78.5 HYPERLIPIDEMIA, UNSPECIFIED HYPERLIPIDEMIA TYPE: ICD-10-CM

## 2023-07-21 NOTE — TELEPHONE ENCOUNTER
PATIENT IS TAKING GEMFIBROZIL PLEASE SEND IN RX   Xenograft Text: The defect edges were debeveled with a #15 scalpel blade.  Given the location of the defect, shape of the defect and the proximity to free margins a xenograft was deemed most appropriate.  The graft was then trimmed to fit the size of the defect.  The graft was then placed in the primary defect and oriented appropriately.

## 2023-07-24 RX ORDER — GEMFIBROZIL 600 MG/1
600 TABLET, FILM COATED ORAL 2 TIMES DAILY
Qty: 180 TABLET | Refills: 1 | Status: SHIPPED | OUTPATIENT
Start: 2023-07-24 | End: 2023-10-05 | Stop reason: SDUPTHER

## 2023-08-22 DIAGNOSIS — I10 ESSENTIAL HYPERTENSION: ICD-10-CM

## 2023-08-23 RX ORDER — AMLODIPINE BESYLATE 5 MG/1
5 TABLET ORAL DAILY
Qty: 90 TABLET | Refills: 1 | Status: SHIPPED | OUTPATIENT
Start: 2023-08-23 | End: 2023-10-05 | Stop reason: SDUPTHER

## 2023-09-21 PROBLEM — D22.60 MELANOCYTIC NEVI OF UNSPECIFIED UPPER LIMB, INCLUDING SHOULDER: Status: ACTIVE | Noted: 2023-04-05

## 2023-09-21 PROBLEM — D22.30 MELANOCYTIC NEVI OF UNSPECIFIED PART OF FACE: Status: ACTIVE | Noted: 2023-04-05

## 2023-09-21 PROBLEM — D22.70 MELANOCYTIC NEVI OF UNSPECIFIED LOWER LIMB, INCLUDING HIP: Status: ACTIVE | Noted: 2023-04-05

## 2023-09-21 PROBLEM — D18.01 HEMANGIOMA OF SKIN AND SUBCUTANEOUS TISSUE: Status: ACTIVE | Noted: 2023-04-05

## 2023-09-21 PROBLEM — D49.2 NEOPLASM OF UNSPECIFIED BEHAVIOR OF BONE, SOFT TISSUE, AND SKIN: Status: ACTIVE | Noted: 2023-04-05

## 2023-09-21 PROBLEM — L81.4 OTHER MELANIN HYPERPIGMENTATION: Status: ACTIVE | Noted: 2023-04-05

## 2023-09-21 PROBLEM — D22.5 MELANOCYTIC NEVI OF TRUNK: Status: ACTIVE | Noted: 2023-04-05

## 2023-09-21 PROBLEM — L21.9 SEBORRHEIC DERMATITIS, UNSPECIFIED: Status: ACTIVE | Noted: 2023-04-05

## 2023-09-21 PROBLEM — Z85.828 PERSONAL HISTORY OF OTHER MALIGNANT NEOPLASM OF SKIN: Status: ACTIVE | Noted: 2023-04-05

## 2023-09-21 PROBLEM — L82.1 OTHER SEBORRHEIC KERATOSIS: Status: ACTIVE | Noted: 2023-04-05

## 2023-09-21 PROBLEM — L21.8 OTHER SEBORRHEIC DERMATITIS: Status: ACTIVE | Noted: 2023-04-05

## 2023-09-21 PROBLEM — D22.22 MELANOCYTIC NEVI OF LEFT EAR AND EXTERNAL AURICULAR CANAL: Status: ACTIVE | Noted: 2023-04-05

## 2023-09-21 PROBLEM — D22.4 MELANOCYTIC NEVI OF SCALP AND NECK: Status: ACTIVE | Noted: 2023-04-05

## 2023-09-21 RX ORDER — ACITRETIN 17.5 MG/1
CAPSULE ORAL
COMMUNITY
Start: 2018-12-21

## 2023-09-28 ENCOUNTER — LAB (OUTPATIENT)
Dept: LAB | Facility: LAB | Age: 83
End: 2023-09-28
Payer: MEDICARE

## 2023-09-28 DIAGNOSIS — E11.65 TYPE 2 DIABETES MELLITUS WITH HYPERGLYCEMIA, WITHOUT LONG-TERM CURRENT USE OF INSULIN (MULTI): ICD-10-CM

## 2023-09-28 DIAGNOSIS — I10 ESSENTIAL HYPERTENSION: ICD-10-CM

## 2023-09-28 LAB
ALANINE AMINOTRANSFERASE (SGPT) (U/L) IN SER/PLAS: 16 U/L (ref 10–52)
ALBUMIN (G/DL) IN SER/PLAS: 4.7 G/DL (ref 3.4–5)
ALKALINE PHOSPHATASE (U/L) IN SER/PLAS: 104 U/L (ref 33–136)
ANION GAP IN SER/PLAS: 14 MMOL/L (ref 10–20)
ASPARTATE AMINOTRANSFERASE (SGOT) (U/L) IN SER/PLAS: 17 U/L (ref 9–39)
BILIRUBIN TOTAL (MG/DL) IN SER/PLAS: 0.7 MG/DL (ref 0–1.2)
CALCIUM (MG/DL) IN SER/PLAS: 9.8 MG/DL (ref 8.6–10.3)
CARBON DIOXIDE, TOTAL (MMOL/L) IN SER/PLAS: 25 MMOL/L (ref 21–32)
CHLORIDE (MMOL/L) IN SER/PLAS: 104 MMOL/L (ref 98–107)
CHOLESTEROL (MG/DL) IN SER/PLAS: 202 MG/DL (ref 0–199)
CHOLESTEROL IN HDL (MG/DL) IN SER/PLAS: 36.2 MG/DL
CHOLESTEROL/HDL RATIO: 5.6
CREATININE (MG/DL) IN SER/PLAS: 1.55 MG/DL (ref 0.5–1.3)
ESTIMATED AVERAGE GLUCOSE FOR HBA1C: 134 MG/DL
GFR MALE: 44 ML/MIN/1.73M2
GLUCOSE (MG/DL) IN SER/PLAS: 105 MG/DL (ref 74–99)
HEMOGLOBIN A1C/HEMOGLOBIN TOTAL IN BLOOD: 6.3 %
LDL: 117 MG/DL (ref 0–99)
NON HDL CHOLESTEROL: 166 MG/DL
POTASSIUM (MMOL/L) IN SER/PLAS: 4.3 MMOL/L (ref 3.5–5.3)
PROTEIN TOTAL: 7.3 G/DL (ref 6.4–8.2)
SODIUM (MMOL/L) IN SER/PLAS: 139 MMOL/L (ref 136–145)
TRIGLYCERIDE (MG/DL) IN SER/PLAS: 243 MG/DL (ref 0–149)
UREA NITROGEN (MG/DL) IN SER/PLAS: 43 MG/DL (ref 6–23)
VLDL: 49 MG/DL (ref 0–40)

## 2023-09-28 PROCEDURE — 80061 LIPID PANEL: CPT

## 2023-09-28 PROCEDURE — 83036 HEMOGLOBIN GLYCOSYLATED A1C: CPT

## 2023-09-28 PROCEDURE — 36415 COLL VENOUS BLD VENIPUNCTURE: CPT

## 2023-09-28 PROCEDURE — 80053 COMPREHEN METABOLIC PANEL: CPT

## 2023-10-02 PROBLEM — M19.112 POST-TRAUMATIC OSTEOARTHRITIS OF LEFT SHOULDER: Status: ACTIVE | Noted: 2023-10-02

## 2023-10-02 PROBLEM — M19.171 POST-TRAUMATIC OSTEOARTHRITIS OF RIGHT ANKLE: Status: ACTIVE | Noted: 2023-10-02

## 2023-10-02 PROBLEM — M17.2 POST-TRAUMATIC OSTEOARTHRITIS OF BOTH KNEES: Status: ACTIVE | Noted: 2023-10-02

## 2023-10-05 ENCOUNTER — OFFICE VISIT (OUTPATIENT)
Dept: PRIMARY CARE | Facility: CLINIC | Age: 83
End: 2023-10-05
Payer: MEDICARE

## 2023-10-05 VITALS
HEIGHT: 68 IN | DIASTOLIC BLOOD PRESSURE: 74 MMHG | HEART RATE: 69 BPM | WEIGHT: 163 LBS | BODY MASS INDEX: 24.71 KG/M2 | OXYGEN SATURATION: 93 % | SYSTOLIC BLOOD PRESSURE: 137 MMHG

## 2023-10-05 DIAGNOSIS — M1A.09X0 CHRONIC GOUT OF MULTIPLE SITES, UNSPECIFIED CAUSE: ICD-10-CM

## 2023-10-05 DIAGNOSIS — K21.9 GASTROESOPHAGEAL REFLUX DISEASE WITHOUT ESOPHAGITIS: ICD-10-CM

## 2023-10-05 DIAGNOSIS — G63 POLYNEUROPATHY ASSOCIATED WITH UNDERLYING DISEASE (MULTI): ICD-10-CM

## 2023-10-05 DIAGNOSIS — N13.8 BPH WITH OBSTRUCTION/LOWER URINARY TRACT SYMPTOMS: ICD-10-CM

## 2023-10-05 DIAGNOSIS — N20.0 KIDNEY STONES: ICD-10-CM

## 2023-10-05 DIAGNOSIS — N40.1 BPH WITH OBSTRUCTION/LOWER URINARY TRACT SYMPTOMS: ICD-10-CM

## 2023-10-05 DIAGNOSIS — Z79.899 MEDICATION MANAGEMENT: Primary | ICD-10-CM

## 2023-10-05 DIAGNOSIS — E11.65 TYPE 2 DIABETES MELLITUS WITH HYPERGLYCEMIA, WITHOUT LONG-TERM CURRENT USE OF INSULIN (MULTI): ICD-10-CM

## 2023-10-05 DIAGNOSIS — E78.5 HYPERLIPIDEMIA, UNSPECIFIED HYPERLIPIDEMIA TYPE: ICD-10-CM

## 2023-10-05 DIAGNOSIS — I10 ESSENTIAL HYPERTENSION: ICD-10-CM

## 2023-10-05 DIAGNOSIS — N18.2 DIABETES MELLITUS DUE TO UNDERLYING CONDITION WITH STAGE 2 CHRONIC KIDNEY DISEASE, WITHOUT LONG-TERM CURRENT USE OF INSULIN (MULTI): ICD-10-CM

## 2023-10-05 DIAGNOSIS — E08.22 DIABETES MELLITUS DUE TO UNDERLYING CONDITION WITH STAGE 2 CHRONIC KIDNEY DISEASE, WITHOUT LONG-TERM CURRENT USE OF INSULIN (MULTI): ICD-10-CM

## 2023-10-05 DIAGNOSIS — E78.1 ESSENTIAL HYPERTRIGLYCERIDEMIA: ICD-10-CM

## 2023-10-05 DIAGNOSIS — N18.32 CHRONIC KIDNEY DISEASE, STAGE 3B (MULTI): ICD-10-CM

## 2023-10-05 PROCEDURE — 3078F DIAST BP <80 MM HG: CPT | Performed by: FAMILY MEDICINE

## 2023-10-05 PROCEDURE — 3075F SYST BP GE 130 - 139MM HG: CPT | Performed by: FAMILY MEDICINE

## 2023-10-05 PROCEDURE — 1160F RVW MEDS BY RX/DR IN RCRD: CPT | Performed by: FAMILY MEDICINE

## 2023-10-05 PROCEDURE — 1159F MED LIST DOCD IN RCRD: CPT | Performed by: FAMILY MEDICINE

## 2023-10-05 PROCEDURE — 80355 GABAPENTIN NON-BLOOD: CPT

## 2023-10-05 PROCEDURE — 1126F AMNT PAIN NOTED NONE PRSNT: CPT | Performed by: FAMILY MEDICINE

## 2023-10-05 PROCEDURE — 99214 OFFICE O/P EST MOD 30 MIN: CPT | Performed by: FAMILY MEDICINE

## 2023-10-05 PROCEDURE — 1036F TOBACCO NON-USER: CPT | Performed by: FAMILY MEDICINE

## 2023-10-05 RX ORDER — BLOOD-GLUCOSE CONTROL, NORMAL
EACH MISCELLANEOUS
Qty: 100 EACH | Refills: 3 | Status: SHIPPED | OUTPATIENT
Start: 2023-10-05 | End: 2024-04-04 | Stop reason: SDUPTHER

## 2023-10-05 RX ORDER — BLOOD-GLUCOSE METER
EACH MISCELLANEOUS
Qty: 100 STRIP | Refills: 3 | Status: SHIPPED | OUTPATIENT
Start: 2023-10-05 | End: 2024-04-04 | Stop reason: SDUPTHER

## 2023-10-05 RX ORDER — POTASSIUM CITRATE 10 MEQ/1
10 TABLET, EXTENDED RELEASE ORAL 2 TIMES DAILY
Qty: 180 TABLET | Refills: 1 | Status: SHIPPED | OUTPATIENT
Start: 2023-10-05 | End: 2024-01-04 | Stop reason: SDUPTHER

## 2023-10-05 RX ORDER — GEMFIBROZIL 600 MG/1
600 TABLET, FILM COATED ORAL 2 TIMES DAILY
Qty: 180 TABLET | Refills: 1 | Status: SHIPPED | OUTPATIENT
Start: 2023-10-05 | End: 2024-01-04 | Stop reason: ALTCHOICE

## 2023-10-05 RX ORDER — OXYBUTYNIN CHLORIDE 10 MG/1
10 TABLET, EXTENDED RELEASE ORAL DAILY
Qty: 90 TABLET | Refills: 1 | Status: SHIPPED | OUTPATIENT
Start: 2023-10-05 | End: 2024-01-04 | Stop reason: SDUPTHER

## 2023-10-05 RX ORDER — AMLODIPINE BESYLATE 5 MG/1
5 TABLET ORAL DAILY
Qty: 90 TABLET | Refills: 1 | Status: SHIPPED | OUTPATIENT
Start: 2023-10-05 | End: 2024-01-04 | Stop reason: SDUPTHER

## 2023-10-05 RX ORDER — FAMOTIDINE 20 MG/1
20 TABLET, FILM COATED ORAL DAILY
Qty: 90 TABLET | Refills: 1 | Status: SHIPPED | OUTPATIENT
Start: 2023-10-05 | End: 2024-01-04 | Stop reason: SDUPTHER

## 2023-10-05 RX ORDER — GABAPENTIN 300 MG/1
CAPSULE ORAL
Qty: 120 CAPSULE | Refills: 2 | Status: SHIPPED | OUTPATIENT
Start: 2023-10-05 | End: 2024-01-04 | Stop reason: SDUPTHER

## 2023-10-05 RX ORDER — ALLOPURINOL 100 MG/1
100 TABLET ORAL DAILY
Qty: 90 TABLET | Refills: 1 | Status: SHIPPED | OUTPATIENT
Start: 2023-10-05 | End: 2024-04-04 | Stop reason: SDUPTHER

## 2023-10-05 NOTE — PROGRESS NOTES
"Subjective   Patient ID: Chilo Gibbs is a 83 y.o. male who presents for 3 MO FUV.    HPI pt states he is still walking for exercise.  Seeing Adriana biorei for orthotics and helps him be able to walk for exercise 1-2 miles at a time for past 6 months    Neuropathy at night time.  Feet get hot and cold and tense at night.  On gabapentin, discussed increasing dose, he will consider.  He is still considering epidurals for back which he was told most likely will help feet    Bp good range, no cp or sob    No side effects to any meds, has been compliant    No gout flaires, has stayed on allopurinol    Wants flu vaccine today    Labs good except lipid profile not as good, TG and LDL higher this time.  Pt states has not been as good with diet        Review of Systems    Objective   /74   Pulse 69   Ht 1.715 m (5' 7.5\")   Wt 73.9 kg (163 lb)   SpO2 93%   BMI 25.15 kg/m²     Physical Exam  Neck:      Vascular: No carotid bruit.   Cardiovascular:      Rate and Rhythm: Normal rate and regular rhythm.      Pulses: Normal pulses.      Heart sounds: Normal heart sounds.   Pulmonary:      Effort: Pulmonary effort is normal.      Breath sounds: Normal breath sounds.   Musculoskeletal:         General: Normal range of motion.      Cervical back: Normal range of motion.      Right lower leg: No edema.      Left lower leg: No edema.   Skin:     General: Skin is warm and dry.   Neurological:      Mental Status: He is alert and oriented to person, place, and time.   Psychiatric:         Mood and Affect: Mood normal.         Thought Content: Thought content normal.         Judgment: Judgment normal.         Assessment/Plan   Problem List Items Addressed This Visit             ICD-10-CM    Chronic kidney disease, stage 3b (CMS/HCC) N18.32    Essential hypertension I10    Relevant Medications    amLODIPine (Norvasc) 5 mg tablet    Essential hypertriglyceridemia - Primary E78.1    GERD (gastroesophageal reflux disease) K21.9 "    Relevant Medications    famotidine (Pepcid) 20 mg tablet    Gout M10.9    Relevant Medications    allopurinol (Zyloprim) 100 mg tablet    Other Relevant Orders    Uric acid    Peripheral neuropathy G62.9    Relevant Medications    gabapentin (Neurontin) 300 mg capsule    BPH with obstruction/lower urinary tract symptoms N40.1, N13.8    Relevant Medications    oxybutynin XL (Ditropan-XL) 10 mg 24 hr tablet    Diabetes mellitus due to underlying condition with stage 2 chronic kidney disease (CMS/[facility]) E08.22, N18.2     Other Visit Diagnoses         Codes    Type 2 diabetes mellitus with hyperglycemia, without long-term current use of insulin (CMS/[facility])     E11.65    Relevant Medications    OneTouch Verio test strips strip    lancets (OneTouch Delica Plus Lancet) 30 gauge misc    Other Relevant Orders    Comprehensive Metabolic Panel    Hemoglobin A1C    Hyperlipidemia, unspecified hyperlipidemia type     E78.5    Relevant Medications    gemfibrozil (Lopid) 600 mg tablet    Other Relevant Orders    Lipid Panel    Kidney stones     N20.0    Relevant Medications    potassium citrate CR (Urocit-K-10) 10 mEq ER tablet        Reviewed labs in detail and repeat in 3 months  Discussed diet to help lipids, since were controlled and now worsened  Refilled all meds, no changes made otherwise  Labs and follow up in 3 months  Discussed finding other way to keep exercise in winter since used to walking outside  HD flu vaccine today

## 2023-10-10 LAB — GABAPENTIN UR-MCNC: >500 UG/ML

## 2023-10-20 ENCOUNTER — OFFICE VISIT (OUTPATIENT)
Dept: NEPHROLOGY | Facility: CLINIC | Age: 83
End: 2023-10-20
Payer: MEDICARE

## 2023-10-20 VITALS
HEIGHT: 67 IN | BODY MASS INDEX: 25.64 KG/M2 | HEART RATE: 72 BPM | WEIGHT: 163.36 LBS | DIASTOLIC BLOOD PRESSURE: 74 MMHG | OXYGEN SATURATION: 94 % | SYSTOLIC BLOOD PRESSURE: 125 MMHG

## 2023-10-20 DIAGNOSIS — M10.00 IDIOPATHIC GOUT, UNSPECIFIED CHRONICITY, UNSPECIFIED SITE: ICD-10-CM

## 2023-10-20 DIAGNOSIS — C61 MALIGNANT NEOPLASM OF PROSTATE (MULTI): ICD-10-CM

## 2023-10-20 DIAGNOSIS — E11.22 TYPE 2 DIABETES MELLITUS WITH STAGE 3 CHRONIC KIDNEY DISEASE, WITHOUT LONG-TERM CURRENT USE OF INSULIN, UNSPECIFIED WHETHER STAGE 3A OR 3B CKD (MULTI): ICD-10-CM

## 2023-10-20 DIAGNOSIS — N20.0 NEPHROLITHIASIS: ICD-10-CM

## 2023-10-20 DIAGNOSIS — N18.30 TYPE 2 DIABETES MELLITUS WITH STAGE 3 CHRONIC KIDNEY DISEASE, WITHOUT LONG-TERM CURRENT USE OF INSULIN, UNSPECIFIED WHETHER STAGE 3A OR 3B CKD (MULTI): ICD-10-CM

## 2023-10-20 DIAGNOSIS — I10 ESSENTIAL HYPERTENSION: ICD-10-CM

## 2023-10-20 DIAGNOSIS — N18.32 CHRONIC KIDNEY DISEASE, STAGE 3B (MULTI): Primary | ICD-10-CM

## 2023-10-20 PROCEDURE — 1160F RVW MEDS BY RX/DR IN RCRD: CPT | Performed by: NURSE PRACTITIONER

## 2023-10-20 PROCEDURE — 1036F TOBACCO NON-USER: CPT | Performed by: NURSE PRACTITIONER

## 2023-10-20 PROCEDURE — 3078F DIAST BP <80 MM HG: CPT | Performed by: NURSE PRACTITIONER

## 2023-10-20 PROCEDURE — 1126F AMNT PAIN NOTED NONE PRSNT: CPT | Performed by: NURSE PRACTITIONER

## 2023-10-20 PROCEDURE — 99213 OFFICE O/P EST LOW 20 MIN: CPT | Performed by: NURSE PRACTITIONER

## 2023-10-20 PROCEDURE — 3074F SYST BP LT 130 MM HG: CPT | Performed by: NURSE PRACTITIONER

## 2023-10-20 PROCEDURE — 1159F MED LIST DOCD IN RCRD: CPT | Performed by: NURSE PRACTITIONER

## 2023-10-20 NOTE — PROGRESS NOTES
History Of Present Illness  Chilo Gibbs is a 83 y.o. male with medical history significant for CKD3, HTN, DMII, gout, GERD, HLD, nephrolithiasis, prostate cancer s/p radiation therapy, and overactive bladder who presents today for a 6-month follow-up for CKD3.     Past Medical History  As above.    Surgical History  He has a past surgical history that includes Other surgical history (10/01/2013); Shoulder surgery (10/01/2013); Other surgical history (10/01/2013); Colonoscopy (10/01/2013); and Other surgical history (10/01/2013).     Social History  He reports that he has never smoked. He has never used smokeless tobacco. He reports that he does not currently use alcohol. He reports that he does not use drugs.    Family History  Family History   Problem Relation Name Age of Onset    Prostate cancer Other          Allergies  Patient has no known allergies.    Review of Systems   Constitutional: Negative.    HENT: Negative.     Respiratory: Negative.     Cardiovascular: Negative.    Gastrointestinal: Negative.    Endocrine: Negative.    Genitourinary: Negative.    Musculoskeletal: Negative.    Skin: Negative.    Neurological: Negative.    Hematological: Negative.    Psychiatric/Behavioral: Negative.          Physical Exam  Constitutional:       Appearance: Normal appearance.   HENT:      Head: Normocephalic and atraumatic.      Mouth/Throat:      Mouth: Mucous membranes are moist.   Eyes:      Pupils: Pupils are equal, round, and reactive to light.   Cardiovascular:      Rate and Rhythm: Normal rate and regular rhythm.      Pulses: Normal pulses.      Heart sounds: Normal heart sounds.   Pulmonary:      Effort: Pulmonary effort is normal.      Breath sounds: Normal breath sounds.   Musculoskeletal:         General: Normal range of motion.   Skin:     General: Skin is warm and dry.   Neurological:      General: No focal deficit present.      Mental Status: He is alert and oriented to person, place, and time.  "  Psychiatric:         Mood and Affect: Mood normal.         Behavior: Behavior normal.         Thought Content: Thought content normal.         Judgment: Judgment normal.          Last Recorded Vitals  Blood pressure 125/74, pulse 72, height 1.702 m (5' 7\"), weight 74.1 kg (163 lb 5.8 oz), SpO2 94 %.    Relevant Results  Recent Results (from the past 2016 hour(s))   Comprehensive Metabolic Panel    Collection Time: 09/28/23  9:51 AM   Result Value Ref Range    Glucose 105 (H) 74 - 99 mg/dL    Sodium 139 136 - 145 mmol/L    Potassium 4.3 3.5 - 5.3 mmol/L    Chloride 104 98 - 107 mmol/L    Bicarbonate 25 21 - 32 mmol/L    Anion Gap 14 10 - 20 mmol/L    Urea Nitrogen 43 (H) 6 - 23 mg/dL    Creatinine 1.55 (H) 0.50 - 1.30 mg/dL    GFR MALE 44 (A) >90 mL/min/1.73m2    Calcium 9.8 8.6 - 10.3 mg/dL    Albumin 4.7 3.4 - 5.0 g/dL    Alkaline Phosphatase 104 33 - 136 U/L    Total Protein 7.3 6.4 - 8.2 g/dL    AST 17 9 - 39 U/L    Total Bilirubin 0.7 0.0 - 1.2 mg/dL    ALT (SGPT) 16 10 - 52 U/L   Lipid Panel    Collection Time: 09/28/23  9:51 AM   Result Value Ref Range    Cholesterol 202 (H) 0 - 199 mg/dL    HDL 36.2 (A) mg/dL    Cholesterol/HDL Ratio 5.6 (A)      (H) 0 - 99 mg/dL    VLDL 49 (H) 0 - 40 mg/dL    Triglycerides 243 (H) 0 - 149 mg/dL    Non HDL Cholesterol 166 mg/dL   Hemoglobin A1C    Collection Time: 09/28/23  9:51 AM   Result Value Ref Range    Hemoglobin A1C 6.3 (A) %    Estimated Average Glucose 134 MG/DL   Gabapentin,Urine    Collection Time: 10/05/23  4:15 PM   Result Value Ref Range    GABAPENTIN,URINE  >500.0 ug/mL         Assessment/Plan   83-year-old male with medical history significant for CKD3, HTN, DMII, gout, GERD, HLD, nephrolithiasis, prostate cancer s/p radiation therapy, and overactive bladder who presents for a 6-month fuv for CKD3.    # CKD3: baseline sCr 1.4 - 1.7 mg/dL. Likely multifactorial including hypertensive nephropathy, diabetic nephropathy, hx of recurrent nephrolithiasis, " prostate cancer s/p radiation therapy, and long term NSAIDs use. Most recent sCr 1.55 mg/dL (23) - at baseline. Stable.    #HTN: well controlled with current regimen.    # DMII: well controlled. Most recent HgbA1c 6.3% (23).    # Gout: no recurrent flare-ups. On anti-gout agent.    # Nephrolithiasis: no recurrence since . Continue potassium citrate. Regular f/u with Urology.    # Prostate cancer s/p radiation therapy and overactive bladder. f/u with Urology.        Plan:          - Labs: UA, urine spot.  - Advised regular home BP checks and keep a log to bring it to next visit. Target < 130/80 mmHg. To call providers if BPs are persistently > 130/80 mmHg.  - Reviewed strategies for preserving remaining kidney function includin) Avoidance of NSAIDs including Aleve (Naprosyn), Motrin (Ibuprofen), Mobic (Meloxicam), Celebrex (Celecoxib) and aspirin as well as PPI acid blocking medications such as Prilosec (omeprazole), Protonix (pantoprazole), and Nexium (esomeprazole), as well as other nephrotoxic agents.   2) Avoidance of tobacco or alcohol use.   3) Adequate hydration daily.   4) Blood pressure target 130/80 mmHg.   5) Daily dietary sodium intake less than 2 grams per day.    6) Maintain healthy lifestyle. Healthy diet and regular exercises.   7) Maintain ideal weight.  - FUV: in 6 months or sooner if concerns arise.     Patient verbalized understanding of above. He will not hesitate to contact Division of Nephrology at [phone] with concerns/questions.     I spent 20 minutes in the professional and overall care of this patient.      Cristal Egan, APRN-CNP  fuv

## 2023-11-07 ASSESSMENT — ENCOUNTER SYMPTOMS
CARDIOVASCULAR NEGATIVE: 1
RESPIRATORY NEGATIVE: 1
GASTROINTESTINAL NEGATIVE: 1
CONSTITUTIONAL NEGATIVE: 1
ENDOCRINE NEGATIVE: 1
NEUROLOGICAL NEGATIVE: 1
PSYCHIATRIC NEGATIVE: 1
MUSCULOSKELETAL NEGATIVE: 1
HEMATOLOGIC/LYMPHATIC NEGATIVE: 1

## 2024-01-02 ENCOUNTER — LAB (OUTPATIENT)
Dept: LAB | Facility: LAB | Age: 84
End: 2024-01-02
Payer: MEDICARE

## 2024-01-02 DIAGNOSIS — Z79.899 MEDICATION MANAGEMENT: ICD-10-CM

## 2024-01-02 DIAGNOSIS — E11.65 TYPE 2 DIABETES MELLITUS WITH HYPERGLYCEMIA, WITHOUT LONG-TERM CURRENT USE OF INSULIN (MULTI): ICD-10-CM

## 2024-01-02 DIAGNOSIS — M1A.09X0 CHRONIC GOUT OF MULTIPLE SITES, UNSPECIFIED CAUSE: ICD-10-CM

## 2024-01-02 DIAGNOSIS — E78.5 HYPERLIPIDEMIA, UNSPECIFIED HYPERLIPIDEMIA TYPE: ICD-10-CM

## 2024-01-02 LAB
ALBUMIN SERPL BCP-MCNC: 4.5 G/DL (ref 3.4–5)
ALP SERPL-CCNC: 105 U/L (ref 33–136)
ALT SERPL W P-5'-P-CCNC: 12 U/L (ref 10–52)
ANION GAP SERPL CALC-SCNC: 12 MMOL/L (ref 10–20)
AST SERPL W P-5'-P-CCNC: 15 U/L (ref 9–39)
BILIRUB SERPL-MCNC: 0.6 MG/DL (ref 0–1.2)
BUN SERPL-MCNC: 36 MG/DL (ref 6–23)
CALCIUM SERPL-MCNC: 9.8 MG/DL (ref 8.6–10.3)
CHLORIDE SERPL-SCNC: 104 MMOL/L (ref 98–107)
CHOLEST SERPL-MCNC: 204 MG/DL (ref 0–199)
CHOLESTEROL/HDL RATIO: 5.5
CO2 SERPL-SCNC: 26 MMOL/L (ref 21–32)
CREAT SERPL-MCNC: 1.63 MG/DL (ref 0.5–1.3)
GFR SERPL CREATININE-BSD FRML MDRD: 42 ML/MIN/1.73M*2
GLUCOSE SERPL-MCNC: 98 MG/DL (ref 74–99)
HDLC SERPL-MCNC: 36.8 MG/DL
LDLC SERPL CALC-MCNC: 119 MG/DL
NON HDL CHOLESTEROL: 167 MG/DL (ref 0–149)
POTASSIUM SERPL-SCNC: 4 MMOL/L (ref 3.5–5.3)
PROT SERPL-MCNC: 7.5 G/DL (ref 6.4–8.2)
SODIUM SERPL-SCNC: 138 MMOL/L (ref 136–145)
TRIGL SERPL-MCNC: 242 MG/DL (ref 0–149)
URATE SERPL-MCNC: 7.1 MG/DL (ref 4–7.5)
VLDL: 48 MG/DL (ref 0–40)

## 2024-01-02 PROCEDURE — 80171 DRUG SCREEN QUANT GABAPENTIN: CPT

## 2024-01-02 PROCEDURE — 83036 HEMOGLOBIN GLYCOSYLATED A1C: CPT

## 2024-01-02 PROCEDURE — 80053 COMPREHEN METABOLIC PANEL: CPT

## 2024-01-02 PROCEDURE — 80061 LIPID PANEL: CPT

## 2024-01-02 PROCEDURE — 36415 COLL VENOUS BLD VENIPUNCTURE: CPT

## 2024-01-02 PROCEDURE — 84550 ASSAY OF BLOOD/URIC ACID: CPT

## 2024-01-03 ENCOUNTER — OFFICE VISIT (OUTPATIENT)
Dept: DERMATOLOGY | Facility: CLINIC | Age: 84
End: 2024-01-03
Payer: MEDICARE

## 2024-01-03 DIAGNOSIS — L40.0 PSORIASIS VULGARIS: Primary | ICD-10-CM

## 2024-01-03 LAB
EST. AVERAGE GLUCOSE BLD GHB EST-MCNC: 131 MG/DL
HBA1C MFR BLD: 6.2 %

## 2024-01-03 PROCEDURE — 1036F TOBACCO NON-USER: CPT | Performed by: DERMATOLOGY

## 2024-01-03 PROCEDURE — 99213 OFFICE O/P EST LOW 20 MIN: CPT | Performed by: DERMATOLOGY

## 2024-01-03 PROCEDURE — 1159F MED LIST DOCD IN RCRD: CPT | Performed by: DERMATOLOGY

## 2024-01-03 PROCEDURE — 1126F AMNT PAIN NOTED NONE PRSNT: CPT | Performed by: DERMATOLOGY

## 2024-01-03 RX ORDER — CALCIPOTRIENE 50 UG/G
CREAM TOPICAL
Qty: 60 G | Refills: 11 | Status: SHIPPED | OUTPATIENT
Start: 2024-01-03

## 2024-01-03 RX ORDER — CLOBETASOL PROPIONATE 0.5 MG/G
OINTMENT TOPICAL 2 TIMES DAILY
Qty: 60 G | Refills: 11 | Status: SHIPPED | OUTPATIENT
Start: 2024-01-03 | End: 2024-01-04 | Stop reason: SDUPTHER

## 2024-01-03 RX ORDER — KETOCONAZOLE 20 MG/ML
SHAMPOO, SUSPENSION TOPICAL
Qty: 120 ML | Refills: 11 | Status: SHIPPED | OUTPATIENT
Start: 2024-01-03

## 2024-01-03 RX ORDER — CLOBETASOL PROPIONATE 0.46 MG/ML
SOLUTION TOPICAL DAILY
Qty: 50 ML | Refills: 11 | Status: SHIPPED | OUTPATIENT
Start: 2024-01-03

## 2024-01-03 ASSESSMENT — ITCH NUMERIC RATING SCALE: HOW SEVERE IS YOUR ITCHING?: 8

## 2024-01-03 NOTE — PROGRESS NOTES
Subjective     Chilo Gibbs is a 83 y.o. male who presents for the following: Psoriasis (Discontinued phototherapy about 6 months ago. Skin is doing okay. Using Clobetasol and calcipotriene  ).     Patient doing well since stopping phototherapy. However, he has some persistent areas on the elbow and knee. Patient started VTAMA with no significant improvement and it cost him$100. He finds the clobetasol and calcipotriene most effective, he would often use it under occlusion. He occasionally needs it every other day. He also has the hand held phototherapy that he uses for scalp and back of knee.     Of note, he was recently diagnosed with diabetes but controlled with diet.He recently lost 20lbs.     Review of Systems:  No other skin or systemic complaints other than what is documented elsewhere in the note.    The following portions of the chart were reviewed this encounter and updated as appropriate:          Skin Cancer History  No skin cancer on file.      Specialty Problems          Dermatology Problems    Hemangioma of skin and subcutaneous tissue    Melanocytic nevi of left ear and external auricular canal    Melanocytic nevi of scalp and neck    Melanocytic nevi of trunk    Melanocytic nevi of unspecified lower limb, including hip    Melanocytic nevi of unspecified part of face    Melanocytic nevi of unspecified upper limb, including shoulder    Other melanin hyperpigmentation    Other seborrheic dermatitis    Other seborrheic keratosis    Personal history of other malignant neoplasm of skin    Seborrheic dermatitis, unspecified    Psoriasis        Objective   Well appearing patient in no apparent distress; mood and affect are within normal limits.    A focused skin examination was performed. All findings within normal limits unless otherwise noted below.    Left Elbow - Posterior, Right Popliteal Fossa  Thick erythematous plaques with white, adherent, micaceous scale.  Physician Global Assessment: 2 - Mild.   Examinations of joints as well as fingers/toes reveals no signs or symptoms of psoriatic arthritis.   < 3% Body Surface Area involved.          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 He is currently using at home phototherapy with his hand held wand to the scalp and persistent areas  - Continue calcipotriene 0.005% cream to use BID in addition to clobetasol   - Continue Clobetasol 0.05% ointment BID to affected areas as needed.   - Continue Ketoconazole 2% shampoo and clobetasol 0.05% solution daily for scalp  - Discontinue VTAMA 1% cream applied to affected areas daily since he did not find it effective and it cost $100 per tube  - RTC 1 year

## 2024-01-04 ENCOUNTER — OFFICE VISIT (OUTPATIENT)
Dept: PRIMARY CARE | Facility: CLINIC | Age: 84
End: 2024-01-04
Payer: MEDICARE

## 2024-01-04 VITALS
OXYGEN SATURATION: 93 % | BODY MASS INDEX: 26.37 KG/M2 | TEMPERATURE: 98 F | WEIGHT: 168 LBS | HEIGHT: 67 IN | HEART RATE: 67 BPM | DIASTOLIC BLOOD PRESSURE: 78 MMHG | RESPIRATION RATE: 18 BRPM | SYSTOLIC BLOOD PRESSURE: 135 MMHG

## 2024-01-04 DIAGNOSIS — E78.5 HYPERLIPIDEMIA, UNSPECIFIED HYPERLIPIDEMIA TYPE: ICD-10-CM

## 2024-01-04 DIAGNOSIS — N20.0 KIDNEY STONES: ICD-10-CM

## 2024-01-04 DIAGNOSIS — E08.22 DIABETES MELLITUS DUE TO UNDERLYING CONDITION WITH STAGE 2 CHRONIC KIDNEY DISEASE, WITHOUT LONG-TERM CURRENT USE OF INSULIN (MULTI): ICD-10-CM

## 2024-01-04 DIAGNOSIS — N40.1 BPH WITH OBSTRUCTION/LOWER URINARY TRACT SYMPTOMS: ICD-10-CM

## 2024-01-04 DIAGNOSIS — K21.9 GASTROESOPHAGEAL REFLUX DISEASE WITHOUT ESOPHAGITIS: ICD-10-CM

## 2024-01-04 DIAGNOSIS — C61 MALIGNANT NEOPLASM OF PROSTATE (MULTI): ICD-10-CM

## 2024-01-04 DIAGNOSIS — Z79.899 MEDICATION MANAGEMENT: ICD-10-CM

## 2024-01-04 DIAGNOSIS — N18.2 DIABETES MELLITUS DUE TO UNDERLYING CONDITION WITH STAGE 2 CHRONIC KIDNEY DISEASE, WITHOUT LONG-TERM CURRENT USE OF INSULIN (MULTI): ICD-10-CM

## 2024-01-04 DIAGNOSIS — N13.8 BPH WITH OBSTRUCTION/LOWER URINARY TRACT SYMPTOMS: ICD-10-CM

## 2024-01-04 DIAGNOSIS — I10 ESSENTIAL HYPERTENSION: Primary | ICD-10-CM

## 2024-01-04 DIAGNOSIS — N18.5 CHRONIC KIDNEY DISEASE, STAGE 5 (MULTI): ICD-10-CM

## 2024-01-04 DIAGNOSIS — G63 POLYNEUROPATHY ASSOCIATED WITH UNDERLYING DISEASE (MULTI): ICD-10-CM

## 2024-01-04 DIAGNOSIS — Z85.46 HISTORY OF PROSTATE CANCER: ICD-10-CM

## 2024-01-04 LAB
AMPHETAMINES UR QL SCN: NORMAL
BARBITURATES UR QL SCN: NORMAL
BENZODIAZ UR QL SCN: NORMAL
BZE UR QL SCN: NORMAL
CANNABINOIDS UR QL SCN: NORMAL
FENTANYL+NORFENTANYL UR QL SCN: NORMAL
GABAPENTIN SERPLBLD-MCNC: 8.9 UG/ML (ref 2–20)
OPIATES UR QL SCN: NORMAL
OXYCODONE+OXYMORPHONE UR QL SCN: NORMAL
PCP UR QL SCN: NORMAL

## 2024-01-04 PROCEDURE — 1159F MED LIST DOCD IN RCRD: CPT | Performed by: FAMILY MEDICINE

## 2024-01-04 PROCEDURE — 3075F SYST BP GE 130 - 139MM HG: CPT | Performed by: FAMILY MEDICINE

## 2024-01-04 PROCEDURE — 80307 DRUG TEST PRSMV CHEM ANLYZR: CPT

## 2024-01-04 PROCEDURE — 80355 GABAPENTIN NON-BLOOD: CPT

## 2024-01-04 PROCEDURE — 1126F AMNT PAIN NOTED NONE PRSNT: CPT | Performed by: FAMILY MEDICINE

## 2024-01-04 PROCEDURE — 1036F TOBACCO NON-USER: CPT | Performed by: FAMILY MEDICINE

## 2024-01-04 PROCEDURE — 3078F DIAST BP <80 MM HG: CPT | Performed by: FAMILY MEDICINE

## 2024-01-04 PROCEDURE — 99214 OFFICE O/P EST MOD 30 MIN: CPT | Performed by: FAMILY MEDICINE

## 2024-01-04 RX ORDER — ROSUVASTATIN CALCIUM 20 MG/1
20 TABLET, COATED ORAL DAILY
Qty: 30 TABLET | Refills: 5 | Status: SHIPPED | OUTPATIENT
Start: 2024-01-04 | End: 2024-04-04 | Stop reason: SDUPTHER

## 2024-01-04 RX ORDER — OXYBUTYNIN CHLORIDE 10 MG/1
10 TABLET, EXTENDED RELEASE ORAL DAILY
Qty: 90 TABLET | Refills: 1 | Status: SHIPPED | OUTPATIENT
Start: 2024-01-04 | End: 2024-04-04 | Stop reason: SDUPTHER

## 2024-01-04 RX ORDER — AMLODIPINE BESYLATE 5 MG/1
5 TABLET ORAL DAILY
Qty: 90 TABLET | Refills: 1 | Status: SHIPPED | OUTPATIENT
Start: 2024-01-04 | End: 2024-02-07 | Stop reason: SDUPTHER

## 2024-01-04 RX ORDER — FAMOTIDINE 20 MG/1
20 TABLET, FILM COATED ORAL DAILY
Qty: 90 TABLET | Refills: 1 | Status: SHIPPED | OUTPATIENT
Start: 2024-01-04 | End: 2024-04-04 | Stop reason: SDUPTHER

## 2024-01-04 RX ORDER — POTASSIUM CITRATE 10 MEQ/1
10 TABLET, EXTENDED RELEASE ORAL 2 TIMES DAILY
Qty: 180 TABLET | Refills: 1 | Status: SHIPPED | OUTPATIENT
Start: 2024-01-04 | End: 2024-04-04 | Stop reason: SDUPTHER

## 2024-01-04 RX ORDER — GABAPENTIN 300 MG/1
CAPSULE ORAL
Qty: 120 CAPSULE | Refills: 2 | Status: SHIPPED | OUTPATIENT
Start: 2024-01-04 | End: 2024-04-04 | Stop reason: SDUPTHER

## 2024-01-04 ASSESSMENT — PAIN SCALES - GENERAL: PAINLEVEL: 0-NO PAIN

## 2024-01-04 NOTE — PROGRESS NOTES
"Subjective   Patient ID: Chilo Gibbs is a 83 y.o. male who presents for 3 month f/up.    HPI   The patient presents to the clinic for a 3-month follow-up. He has past medical history of hypertension, hyperlipidemia, type 2 diabetes mellitus, GERD, CKD, gout, arthritis, peripheral neuropathy, prostate cancer, and seborrheic dermatitis.    He has been compliant with medication and denies any side-effects to current medication.    The patient had labs done on 01/02/2024 and requests to have them reviewed. The patient denies any exacerbation of gout-associated symptoms although uric acid above 6 even w med compliance. The patient states that he avoid processed foods. He does report that he eats large amounts of red meat, cheese, and butter.  TG elevated and lipids, will try starting crestor and stopping gemfibrozil    The patient continues to take oxybutynin XL 10 mg as directed by his urology healthcare provider (ABDULLAHI Allan), but he states that the medication is not working for him.  Wants different urologist, requesting referral will do today.      Checks bs at home fastings usually around 100.  Feels well, not as active less walking    Uses gabapentin regularly and his feet symptoms are controlled    Had flu vaccine, discussed covid vaccine and answered questions      Review of Systems    Objective   /78   Pulse 67   Temp 36.7 °C (98 °F)   Resp 18   Ht 1.702 m (5' 7\")   Wt 76.2 kg (168 lb)   SpO2 93%   BMI 26.31 kg/m²     Physical Exam  Constitutional:       Appearance: Normal appearance.   Neck:      Vascular: No carotid bruit.   Cardiovascular:      Rate and Rhythm: Normal rate and regular rhythm.      Pulses: Normal pulses.      Heart sounds: Normal heart sounds.   Pulmonary:      Effort: Pulmonary effort is normal.      Breath sounds: Normal breath sounds.   Musculoskeletal:      Cervical back: Normal range of motion.      Right lower leg: No edema.      Left lower leg: No edema.   Skin:     " General: Skin is warm and dry.   Neurological:      Mental Status: He is alert and oriented to person, place, and time.      Sensory: Sensory deficit present.      Comments: Pt has decreased sensation bottom of feet.  No open wounds and adequate nail care   Psychiatric:         Mood and Affect: Mood normal.         Thought Content: Thought content normal.         Judgment: Judgment normal.         Assessment/Plan   Problem List Items Addressed This Visit             ICD-10-CM    Chronic kidney disease, stage 5 (CMS/[facility]) N18.5    Malignant neoplasm of prostate (CMS/[facility]) C61     Prostate ca 2012.  PSA yearly.  Seeing urology regularly and would like new referral today         Essential hypertension - Primary I10    Relevant Medications    amLODIPine (Norvasc) 5 mg tablet    Other Relevant Orders    Comprehensive Metabolic Panel    GERD (gastroesophageal reflux disease) K21.9    Relevant Medications    famotidine (Pepcid) 20 mg tablet    Peripheral neuropathy G62.9    Relevant Medications    gabapentin (Neurontin) 300 mg capsule    BPH with obstruction/lower urinary tract symptoms N40.1, N13.8    Relevant Medications    oxybutynin XL (Ditropan-XL) 10 mg 24 hr tablet    Other Relevant Orders    Referral to Urology    Diabetes mellitus due to underlying condition with stage 2 chronic kidney disease (CMS/[facility]) E08.22, N18.2    Relevant Orders    Hemoglobin A1C    Hyperlipidemia E78.5    Relevant Medications    rosuvastatin (Crestor) 20 mg tablet    Other Relevant Orders    Comprehensive Metabolic Panel    Lipid Panel     Other Visit Diagnoses         Codes    Kidney stones     N20.0    Relevant Medications    potassium citrate CR (Urocit-K-10) 10 mEq ER tablet    Other Relevant Orders    Referral to Urology    History of prostate cancer     Z85.46    Relevant Orders    Referral to Urology    Medication management     Z79.899    Relevant Orders    Drug Screen, Urine With Reflex to Confirmation    Gabapentin,Urine                The patient's labs (01/02/2024) were reviewed. His uric acid was on the higher end of normal range (7.1 mg/dL). The patient's total cholesterol (204 mg/dL), LDL (119 mg/dL), VLDL (48 mg/dL), triglycerides (242 mg/dL), and non-HDL cholesterol (167 mg/dL) were elevated. The rest of his cholesterol results were WNL. The patient was advised to continue making mindful dietary choices and to avoid fatty foods. In addition, the patient was prescribed rosuvastatin 20 mg and instructed to take 1 tablet (20 mg) by mouth once daily. He was advised to discontinue his current gemfibrozil medication. His hemoglobin A1C was 6.2 % (pre-diabetic range). His chemistries were WNL.  Blood  urea nitrogen (36 mg/dL) and creatinine (1.63 mg/dL) were elevated. His eGFR (42) was below normal range. Additionally, his liver enzymes were WNL. Repeat labs (CMP, A1C, lipid panel) were ordered for the patient to complete before his next visit in 3 months.    The patient received refills for current medication (amlodipine 5 mg, famotidine 20 mg, gabapentin 300 mg, potassium citrate CR 10 mEq, oxybutynin XL 10 mg). He was instructed to continue taking medication as previously directed.    In accordance with his request, the patient received a new referral to urology for concerns of urinary frequency. The patient was advised to consult with a urologist regarding his oxybutynin XL 10 mg medication.    Prospective immunizations (COVID-19 booster) were discussed with the patient.     A urine sample was taken from the patient for UDS/CSA as the patient is taking Gabapentin medication.  Doing well on medication , helping symptoms and denies side effects  OARRs reviewed, no suspicious activity, pt compliant with medication    He will follow-up in 3 months, unless otherwise needed.    Scribe Attestation  By signing my name below, IRicardo Scribe   attest that this documentation has been prepared under the direction and in the presence of  Sandra Daniel DO.

## 2024-01-08 ENCOUNTER — TELEPHONE (OUTPATIENT)
Dept: DERMATOLOGY | Facility: CLINIC | Age: 84
End: 2024-01-08
Payer: MEDICARE

## 2024-01-08 NOTE — TELEPHONE ENCOUNTER
Patient called and said Clobetasol ointment was discontinued. I left a voicemail for pharmacy at [facility] to reorder the Clobetasol ointment - I gave a verbal order. I then left a voicemail for patient letting him know this was completed.

## 2024-01-09 LAB — GABAPENTIN UR-MCNC: 454.9 UG/ML

## 2024-02-07 DIAGNOSIS — I10 ESSENTIAL HYPERTENSION: ICD-10-CM

## 2024-02-07 RX ORDER — AMLODIPINE BESYLATE 5 MG/1
5 TABLET ORAL DAILY
Qty: 90 TABLET | Refills: 1 | Status: SHIPPED | OUTPATIENT
Start: 2024-02-07 | End: 2024-04-04 | Stop reason: SDUPTHER

## 2024-02-26 ENCOUNTER — APPOINTMENT (OUTPATIENT)
Dept: UROLOGY | Facility: HOSPITAL | Age: 84
End: 2024-02-26
Payer: MEDICARE

## 2024-04-02 ENCOUNTER — LAB (OUTPATIENT)
Dept: LAB | Facility: LAB | Age: 84
End: 2024-04-02
Payer: MEDICARE

## 2024-04-02 DIAGNOSIS — N18.2 DIABETES MELLITUS DUE TO UNDERLYING CONDITION WITH STAGE 2 CHRONIC KIDNEY DISEASE, WITHOUT LONG-TERM CURRENT USE OF INSULIN (MULTI): ICD-10-CM

## 2024-04-02 DIAGNOSIS — I10 ESSENTIAL HYPERTENSION: ICD-10-CM

## 2024-04-02 DIAGNOSIS — E78.5 HYPERLIPIDEMIA, UNSPECIFIED HYPERLIPIDEMIA TYPE: ICD-10-CM

## 2024-04-02 DIAGNOSIS — E08.22 DIABETES MELLITUS DUE TO UNDERLYING CONDITION WITH STAGE 2 CHRONIC KIDNEY DISEASE, WITHOUT LONG-TERM CURRENT USE OF INSULIN (MULTI): ICD-10-CM

## 2024-04-02 LAB
ALBUMIN SERPL BCP-MCNC: 4.4 G/DL (ref 3.4–5)
ALP SERPL-CCNC: 103 U/L (ref 33–136)
ALT SERPL W P-5'-P-CCNC: 17 U/L (ref 10–52)
ANION GAP SERPL CALC-SCNC: 9 MMOL/L (ref 10–20)
AST SERPL W P-5'-P-CCNC: 16 U/L (ref 9–39)
BILIRUB SERPL-MCNC: 0.9 MG/DL (ref 0–1.2)
BUN SERPL-MCNC: 41 MG/DL (ref 6–23)
CALCIUM SERPL-MCNC: 9.4 MG/DL (ref 8.6–10.3)
CHLORIDE SERPL-SCNC: 106 MMOL/L (ref 98–107)
CHOLEST SERPL-MCNC: 111 MG/DL (ref 0–199)
CHOLESTEROL/HDL RATIO: 4
CO2 SERPL-SCNC: 25 MMOL/L (ref 21–32)
CREAT SERPL-MCNC: 1.49 MG/DL (ref 0.5–1.3)
EGFRCR SERPLBLD CKD-EPI 2021: 46 ML/MIN/1.73M*2
EST. AVERAGE GLUCOSE BLD GHB EST-MCNC: 146 MG/DL
GLUCOSE SERPL-MCNC: 102 MG/DL (ref 74–99)
HBA1C MFR BLD: 6.7 %
HDLC SERPL-MCNC: 28 MG/DL
LDLC SERPL CALC-MCNC: 25 MG/DL
NON HDL CHOLESTEROL: 83 MG/DL (ref 0–149)
POTASSIUM SERPL-SCNC: 3.8 MMOL/L (ref 3.5–5.3)
PROT SERPL-MCNC: 6.9 G/DL (ref 6.4–8.2)
SODIUM SERPL-SCNC: 136 MMOL/L (ref 136–145)
TRIGL SERPL-MCNC: 290 MG/DL (ref 0–149)
VLDL: 58 MG/DL (ref 0–40)

## 2024-04-02 PROCEDURE — 83036 HEMOGLOBIN GLYCOSYLATED A1C: CPT

## 2024-04-02 PROCEDURE — 36415 COLL VENOUS BLD VENIPUNCTURE: CPT

## 2024-04-02 PROCEDURE — 80061 LIPID PANEL: CPT

## 2024-04-02 PROCEDURE — 80053 COMPREHEN METABOLIC PANEL: CPT

## 2024-04-04 ENCOUNTER — OFFICE VISIT (OUTPATIENT)
Dept: PRIMARY CARE | Facility: CLINIC | Age: 84
End: 2024-04-04
Payer: MEDICARE

## 2024-04-04 VITALS
OXYGEN SATURATION: 95 % | HEIGHT: 67 IN | TEMPERATURE: 97.6 F | WEIGHT: 171 LBS | HEART RATE: 79 BPM | RESPIRATION RATE: 18 BRPM | BODY MASS INDEX: 26.84 KG/M2 | SYSTOLIC BLOOD PRESSURE: 130 MMHG | DIASTOLIC BLOOD PRESSURE: 62 MMHG

## 2024-04-04 DIAGNOSIS — N13.8 BPH WITH OBSTRUCTION/LOWER URINARY TRACT SYMPTOMS: ICD-10-CM

## 2024-04-04 DIAGNOSIS — M1A.09X0 CHRONIC GOUT OF MULTIPLE SITES, UNSPECIFIED CAUSE: ICD-10-CM

## 2024-04-04 DIAGNOSIS — N40.1 BPH WITH OBSTRUCTION/LOWER URINARY TRACT SYMPTOMS: ICD-10-CM

## 2024-04-04 DIAGNOSIS — I10 ESSENTIAL HYPERTENSION: ICD-10-CM

## 2024-04-04 DIAGNOSIS — G63 POLYNEUROPATHY ASSOCIATED WITH UNDERLYING DISEASE (MULTI): ICD-10-CM

## 2024-04-04 DIAGNOSIS — E78.5 HYPERLIPIDEMIA, UNSPECIFIED HYPERLIPIDEMIA TYPE: ICD-10-CM

## 2024-04-04 DIAGNOSIS — N20.0 KIDNEY STONES: ICD-10-CM

## 2024-04-04 DIAGNOSIS — E11.65 TYPE 2 DIABETES MELLITUS WITH HYPERGLYCEMIA, WITHOUT LONG-TERM CURRENT USE OF INSULIN (MULTI): ICD-10-CM

## 2024-04-04 DIAGNOSIS — K21.9 GASTROESOPHAGEAL REFLUX DISEASE WITHOUT ESOPHAGITIS: ICD-10-CM

## 2024-04-04 DIAGNOSIS — Z00.00 MEDICARE ANNUAL WELLNESS VISIT, SUBSEQUENT: Primary | ICD-10-CM

## 2024-04-04 PROCEDURE — 99214 OFFICE O/P EST MOD 30 MIN: CPT | Performed by: FAMILY MEDICINE

## 2024-04-04 PROCEDURE — 1159F MED LIST DOCD IN RCRD: CPT | Performed by: FAMILY MEDICINE

## 2024-04-04 PROCEDURE — 1160F RVW MEDS BY RX/DR IN RCRD: CPT | Performed by: FAMILY MEDICINE

## 2024-04-04 PROCEDURE — 3075F SYST BP GE 130 - 139MM HG: CPT | Performed by: FAMILY MEDICINE

## 2024-04-04 PROCEDURE — 1157F ADVNC CARE PLAN IN RCRD: CPT | Performed by: FAMILY MEDICINE

## 2024-04-04 PROCEDURE — 1126F AMNT PAIN NOTED NONE PRSNT: CPT | Performed by: FAMILY MEDICINE

## 2024-04-04 PROCEDURE — 3078F DIAST BP <80 MM HG: CPT | Performed by: FAMILY MEDICINE

## 2024-04-04 PROCEDURE — G0439 PPPS, SUBSEQ VISIT: HCPCS | Performed by: FAMILY MEDICINE

## 2024-04-04 PROCEDURE — 1123F ACP DISCUSS/DSCN MKR DOCD: CPT | Performed by: FAMILY MEDICINE

## 2024-04-04 PROCEDURE — 1170F FXNL STATUS ASSESSED: CPT | Performed by: FAMILY MEDICINE

## 2024-04-04 PROCEDURE — 1036F TOBACCO NON-USER: CPT | Performed by: FAMILY MEDICINE

## 2024-04-04 RX ORDER — ROSUVASTATIN CALCIUM 20 MG/1
20 TABLET, COATED ORAL DAILY
Qty: 30 TABLET | Refills: 5 | Status: SHIPPED | OUTPATIENT
Start: 2024-04-04 | End: 2024-10-01

## 2024-04-04 RX ORDER — BLOOD-GLUCOSE CONTROL, NORMAL
EACH MISCELLANEOUS
Qty: 100 EACH | Refills: 3 | Status: SHIPPED | OUTPATIENT
Start: 2024-04-04

## 2024-04-04 RX ORDER — POTASSIUM CITRATE 10 MEQ/1
10 TABLET, EXTENDED RELEASE ORAL 2 TIMES DAILY
Qty: 180 TABLET | Refills: 1 | Status: SHIPPED | OUTPATIENT
Start: 2024-04-04 | End: 2024-10-01

## 2024-04-04 RX ORDER — FAMOTIDINE 20 MG/1
20 TABLET, FILM COATED ORAL DAILY
Qty: 90 TABLET | Refills: 1 | Status: SHIPPED | OUTPATIENT
Start: 2024-04-04 | End: 2024-10-01

## 2024-04-04 RX ORDER — GABAPENTIN 300 MG/1
CAPSULE ORAL
Qty: 120 CAPSULE | Refills: 2 | Status: SHIPPED | OUTPATIENT
Start: 2024-04-04

## 2024-04-04 RX ORDER — AMLODIPINE BESYLATE 5 MG/1
5 TABLET ORAL DAILY
Qty: 90 TABLET | Refills: 1 | Status: SHIPPED | OUTPATIENT
Start: 2024-04-04 | End: 2024-10-01

## 2024-04-04 RX ORDER — OXYBUTYNIN CHLORIDE 10 MG/1
10 TABLET, EXTENDED RELEASE ORAL DAILY
Qty: 90 TABLET | Refills: 1 | Status: SHIPPED | OUTPATIENT
Start: 2024-04-04 | End: 2024-10-01

## 2024-04-04 RX ORDER — BLOOD-GLUCOSE METER
EACH MISCELLANEOUS
Qty: 100 STRIP | Refills: 3 | Status: SHIPPED | OUTPATIENT
Start: 2024-04-04

## 2024-04-04 RX ORDER — ALLOPURINOL 100 MG/1
100 TABLET ORAL DAILY
Qty: 90 TABLET | Refills: 1 | Status: SHIPPED | OUTPATIENT
Start: 2024-04-04 | End: 2024-10-01

## 2024-04-04 ASSESSMENT — ENCOUNTER SYMPTOMS
DEPRESSION: 0
LOSS OF SENSATION IN FEET: 0
OCCASIONAL FEELINGS OF UNSTEADINESS: 0

## 2024-04-04 ASSESSMENT — ANXIETY QUESTIONNAIRES
5. BEING SO RESTLESS THAT IT IS HARD TO SIT STILL: NOT AT ALL
3. WORRYING TOO MUCH ABOUT DIFFERENT THINGS: NOT AT ALL
1. FEELING NERVOUS, ANXIOUS, OR ON EDGE: NOT AT ALL
4. TROUBLE RELAXING: NOT AT ALL
GAD7 TOTAL SCORE: 0
2. NOT BEING ABLE TO STOP OR CONTROL WORRYING: NOT AT ALL
IF YOU CHECKED OFF ANY PROBLEMS ON THIS QUESTIONNAIRE, HOW DIFFICULT HAVE THESE PROBLEMS MADE IT FOR YOU TO DO YOUR WORK, TAKE CARE OF THINGS AT HOME, OR GET ALONG WITH OTHER PEOPLE: NOT DIFFICULT AT ALL
6. BECOMING EASILY ANNOYED OR IRRITABLE: NOT AT ALL
7. FEELING AFRAID AS IF SOMETHING AWFUL MIGHT HAPPEN: NOT AT ALL

## 2024-04-04 ASSESSMENT — ACTIVITIES OF DAILY LIVING (ADL)
DRESSING: INDEPENDENT
BATHING: INDEPENDENT
GROCERY_SHOPPING: INDEPENDENT
TAKING_MEDICATION: INDEPENDENT
MANAGING_FINANCES: INDEPENDENT
DOING_HOUSEWORK: INDEPENDENT

## 2024-04-04 ASSESSMENT — PATIENT HEALTH QUESTIONNAIRE - PHQ9
SUM OF ALL RESPONSES TO PHQ QUESTIONS 1-9: 0
2. FEELING DOWN, DEPRESSED OR HOPELESS: NOT AT ALL
5. POOR APPETITE OR OVEREATING: NOT AT ALL
7. TROUBLE CONCENTRATING ON THINGS, SUCH AS READING THE NEWSPAPER OR WATCHING TELEVISION: NOT AT ALL
10. IF YOU CHECKED OFF ANY PROBLEMS, HOW DIFFICULT HAVE THESE PROBLEMS MADE IT FOR YOU TO DO YOUR WORK, TAKE CARE OF THINGS AT HOME, OR GET ALONG WITH OTHER PEOPLE: NOT DIFFICULT AT ALL
3. TROUBLE FALLING OR STAYING ASLEEP OR SLEEPING TOO MUCH: NOT AT ALL
8. MOVING OR SPEAKING SO SLOWLY THAT OTHER PEOPLE COULD HAVE NOTICED. OR THE OPPOSITE, BEING SO FIGETY OR RESTLESS THAT YOU HAVE BEEN MOVING AROUND A LOT MORE THAN USUAL: NOT AT ALL
9. THOUGHTS THAT YOU WOULD BE BETTER OFF DEAD, OR OF HURTING YOURSELF: NOT AT ALL
4. FEELING TIRED OR HAVING LITTLE ENERGY: NOT AT ALL
SUM OF ALL RESPONSES TO PHQ9 QUESTIONS 1 AND 2: 0
1. LITTLE INTEREST OR PLEASURE IN DOING THINGS: NOT AT ALL
6. FEELING BAD ABOUT YOURSELF - OR THAT YOU ARE A FAILURE OR HAVE LET YOURSELF OR YOUR FAMILY DOWN: NOT AT ALL

## 2024-04-04 ASSESSMENT — PAIN SCALES - GENERAL: PAINLEVEL: 0-NO PAIN

## 2024-04-04 NOTE — PROGRESS NOTES
Subjective   Reason for Visit: Chilo Gibbs is an 84 y.o. male here for a Medicare Wellness visit.     Past Medical, Surgical, and Family History reviewed and updated in chart.    Reviewed all medications by prescribing practitioner or clinical pharmacist (such as prescriptions, OTCs, herbal therapies and supplements) and documented in the medical record.    HPI  The patient presents to the clinic for an annual medicare wellness visit. He has past medical history of hypertension, hyperlipidemia, hypertriglyceridemia, bilateral sensorineural hearing loss, type 2 diabetes mellitus, GERD, abdominal bloating, CKD (stage 5), ED, nephrolithiasis, BPH, renal insufficiency, urinary urgency, malignant neoplasm of prostate, malignant neoplasm of skin, gout, osteoarthritis of several sites, psoriasis, and seborrheic keratosis.     He has been compliant with medication and denies any side-effects to current medications.     The patient had labs done on 04/02/2024 and requests for them to be reviewed.     The patient states that his diet is the best that it has been. He may eat unhealthy foods occasionally (~once or twice per week). He has mostly been adhering to a diabetic diet. He notes that he may have went off his diet for a short period of time when he was on vacation. He intends to start exercising more as his diet improves.      He is controlling diabetes w diet and exercise     He states that his ambulation has improved since he received custom orthotics and shoes from [employer].  .He needs new orthotics ordered, discussed if they send fax for what he needs will sign form.  Also discussed can get yearly diabetic shoes     He continues on rosuvastatin 20 mg daily for treatment of hyperlipidemia. He is tolerating this dose of rosuvastatin medication well. He denies any side-effects to his rosuvastatin medication.     He also continues on famotidine 20 mg daily for treatment of GERD. He continues on allopurinol 100  "mg daily for treatment of gout. He continues on gabapentin 300 for treatment of neuropathic pain. He also continues on oxybutynin XL 10 mg daily for treatment of overactive bladder. He continues on potassium citrate CR 10 mEq (2 times daily) for treatment of hyponatremia. He denies any side-effects to any of these medications.     His blood pressure (130/62) was within normal range when checked in the clinic today. He continues on amlodipine 5 mg daily for treatment of hypertension. He denies any side-effects to amlodipine medication.  Denies cp or sob, feels well     He states that he recently started visiting a new dermatologist (Dr. Ochoa).  Had skin check etc    Patient Care Team:  Sandra Daniel DO as PCP - General (Family Medicine)  Sandra Daniel DO as PCP - [facility] PCP     Review of Systems    Objective   Vitals:  /62   Pulse 79   Temp 36.4 °C (97.6 °F)   Resp 18   Ht 1.702 m (5' 7\")   Wt 77.6 kg (171 lb)   SpO2 95%   BMI 26.78 kg/m²       Physical Exam  Constitutional:       Appearance: Normal appearance.   Cardiovascular:      Rate and Rhythm: Normal rate and regular rhythm.      Pulses: Normal pulses.      Heart sounds: Normal heart sounds.   Pulmonary:      Effort: Pulmonary effort is normal.      Breath sounds: Normal breath sounds.   Abdominal:      General: Bowel sounds are normal.      Palpations: Abdomen is soft. There is no mass.      Tenderness: There is no abdominal tenderness.   Musculoskeletal:         General: Normal range of motion.      Cervical back: Normal range of motion and neck supple.      Right lower leg: No edema.      Left lower leg: No edema.   Skin:     General: Skin is warm and dry.   Neurological:      Mental Status: He is alert and oriented to person, place, and time.   Psychiatric:         Mood and Affect: Mood normal.         Thought Content: Thought content normal.         Judgment: Judgment normal. "         Assessment/Plan   Problem List Items Addressed This Visit       Essential hypertension    Relevant Medications    amLODIPine (Norvasc) 5 mg tablet    GERD (gastroesophageal reflux disease)    Relevant Medications    famotidine (Pepcid) 20 mg tablet    Gout    Relevant Medications    allopurinol (Zyloprim) 100 mg tablet    Other Relevant Orders    Uric acid    Peripheral neuropathy    Relevant Medications    gabapentin (Neurontin) 300 mg capsule    BPH with obstruction/lower urinary tract symptoms    Relevant Medications    oxybutynin XL (Ditropan-XL) 10 mg 24 hr tablet    Other Relevant Orders    PSA    Hyperlipidemia    Relevant Medications    rosuvastatin (Crestor) 20 mg tablet    Other Relevant Orders    Comprehensive Metabolic Panel    Lipid Panel    Type 2 diabetes mellitus with hyperglycemia, without long-term current use of insulin (CMS/[facility])    Relevant Medications    OneTouch Verio test strips strip    lancets (OneTouch Delica Plus Lancet) 30 gauge misc    Other Relevant Orders    Hemoglobin A1C     Other Visit Diagnoses       Medicare annual wellness visit, subsequent    -  Primary    Kidney stones        Relevant Medications    potassium citrate CR (Urocit-K-10) 10 mEq ER tablet               The patient's labs (04/02/2024) were reviewed. Chemistries were mostly WNL, but FBS (102) was slightly elevated and anion gap (9) was slightly below normal range. Kidney function markers were mostly abnormal (urea nitrogen 41/creatinine 1.49/eGFR 46). Liver enzymes were WNL. His hemoglobin A1C was 6.7 % (diabetic range). Notably, his hemoglobin A1C has increased from his previous result of 6.2 % on 01/02/2024. His cholesterol results were mostly WNL (total cholesterol 111/HDL 28/LDL 25), but his triglycerides (290) were elevated. He was instructed to eat a healthy whole food diet with regular exercise. Repeat labs (A1C, lipid panel, PSA, uric acid) were ordered for the patient to complete in 3 months.     The  patient received refills for current medication (amlodipine 5 mg, allopurinol 100 mg, lancets 30 gauge, OneTouch Verio test strips, rosuvastatin 20 mg, potassium citrate CR 10 mEq, oxybutynin XL 10 mg, gabapentin 300 mg, famotidine 20 mg). He was instructed to continue taking medication as previously directed. In regards his gabapentin prescription, OARRS were reviewed. Risk and benefit ratio was assessed. No suspicious activity was observed. His most recent UDS/CSA was conducted on 01/04/2024.     He is up-to-date on most immunizations (Prevnar 20, Zoster, COVID-19 booster).     A comprehensive physical exam was conducted on the patient      He will follow-up in 3 months, unless otherwise needed.    Scribe Attestation  By signing my name below, IRicardo Scribe   attest that this documentation has been prepared under the direction and in the presence of Sandra Daniel DO.

## 2024-04-04 NOTE — PROGRESS NOTES
Subjective   Reason for Visit: Chilo Gibbs is an 84 y.o. male here for a Medicare Wellness visit.     Past Medical, Surgical, and Family History reviewed and updated in chart.    Reviewed all medications by prescribing practitioner or clinical pharmacist (such as prescriptions, OTCs, herbal therapies and supplements) and documented in the medical record.    HPI  The patient presents to the clinic for an annual medicare wellness visit. He has past medical history of hypertension, hyperlipidemia, hypertriglyceridemia, bilateral sensorineural hearing loss, type 2 diabetes mellitus, GERD, abdominal bloating, CKD (stage 5), ED, nephrolithiasis, BPH, renal insufficiency, urinary urgency, malignant neoplasm of prostate, malignant neoplasm of skin, gout, osteoarthritis of several sites, psoriasis, and seborrheic keratosis.    He has been compliant with medication and denies any side-effects to current medications.    The patient had labs done on 04/02/2024 and requests for them to be reviewed.    The patient states that his diet is the best that it has been. He may eat unhealthy foods occasionally (~once or twice per week). He has mostly been adhering to a diabetic diet. He notes that he may have went off his diet for a short period of time when he was on vacation. He intends to start exercising more as his diet improves.    He states that his ambulation has improved since he received custom orthotics.    He continues on rosuvastatin 20 mg daily for treatment of hyperlipidemia. He is tolerating this dose of rosuvastatin medication well. He denies any side-effects to his rosuvastatin medication.    He also continues on famotidine 20 mg daily for treatment of GERD. He continues on allopurinol 100 mg daily for treatment of gout. He continues on gabapentin 300 for treatment of neuropathic pain. He also continues on oxybutynin XL 10 mg daily for treatment of overactive bladder. He continues on potassium citrate CR 10 mEq (2  "times daily) for treatment of hyponatremia. He denies any side-effects to any of these medications.    His blood pressure (130/62) was within normal range when checked in the clinic today. He continues on amlodipine 5 mg daily for treatment of hypertension. He denies any side-effects to amlodipine medication.    He states that he recently started visiting a new dermatologist (Dr. Ochoa).    Patient Care Team:  Sandra Daniel DO as PCP - General (Family Medicine)  Sandra Daniel DO as PCP - [employer] P[facility]     Review of Systems    Objective   Vitals:  /62   Pulse 79   Temp 36.4 °C (97.6 °F)   Resp 18   Ht 1.702 m (5' 7\")   Wt 77.6 kg (171 lb)   SpO2 95%   BMI 26.78 kg/m²       Physical Exam    Assessment/Plan   Problem List Items Addressed This Visit          Cardiac and Vasculature    Essential hypertension    Hyperlipidemia       Gastrointestinal and Abdominal    GERD (gastroesophageal reflux disease)       Genitourinary and Reproductive    BPH with obstruction/lower urinary tract symptoms       Musculoskeletal and Injuries    Gout       Neuro    Peripheral neuropathy     Other Visit Diagnoses       Kidney stones        Type 2 diabetes mellitus with hyperglycemia, without long-term current use of insulin (CMS/[facility])                   The patient's labs (04/02/2024) were reviewed. Chemistries were mostly WNL, but FBS (102) was slightly elevated and anion gap (9) was slightly below normal range. Kidney function markers were mostly abnormal (urea nitrogen 41/creatinine 1.49/eGFR 46). Liver enzymes were WNL. His hemoglobin A1C was 6.7 % (diabetic range). Notably, his hemoglobin A1C has increased from his previous result of 6.2 % on 01/02/2024. His cholesterol results were mostly WNL (total cholesterol 111/HDL 28/LDL 25), but his triglycerides (290) were elevated. He was instructed to eat a healthy whole food diet with regular exercise. Repeat labs (A1C, lipid panel, PSA, " uric acid) were ordered for the patient to complete in 3 months.    The patient received refills for current medication (amlodipine 5 mg, allopurinol 100 mg, lancets 30 gauge, OneTouch Verio test strips, rosuvastatin 20 mg, potassium citrate CR 10 mEq, oxybutynin XL 10 mg, gabapentin 300 mg, famotidine 20 mg). He was instructed to continue taking medication as previously directed. In regards his gabapentin prescription, OARRS were reviewed. Risk and benefit ratio was assessed. No suspicious activity was observed. His most recent UDS/CSA was conducted on 01/04/2024.    He is up-to-date on most immunizations (Prevnar 20, Zoster, COVID-19 booster).    A comprehensive physical exam was conducted on the patient in the clinic today. There were no major abnormalities found.    He will follow-up in 3 months, unless otherwise needed.    Scribe Attestation  By signing my name below, IRicardo Scribe   attest that this documentation has been prepared under the direction and in the presence of Sandra Daniel DO.

## 2024-04-04 NOTE — PROGRESS NOTES
"Subjective   Patient ID: Chilo Gibbs is a 84 y.o. male who presents for Medicare Annual Wellness Visit Subsequent.    HPI       Review of Systems    Objective   /62   Pulse 79   Temp 36.4 °C (97.6 °F)   Resp 18   Ht 1.702 m (5' 7\")   Wt 77.6 kg (171 lb)   SpO2 95%   BMI 26.78 kg/m²     Physical Exam    Assessment/Plan   {Assess/PlanSmartLinks:[id number]}         Scribe Attestation  By signing my name below, I, Cedric Markham   attest that this documentation has been prepared under the direction and in the presence of Sandra Daniel DO.    "

## 2024-04-08 ENCOUNTER — TELEPHONE (OUTPATIENT)
Dept: ORTHOPEDIC SURGERY | Facility: CLINIC | Age: 84
End: 2024-04-08
Payer: MEDICARE

## 2024-04-08 NOTE — TELEPHONE ENCOUNTER
He is requesting another script to [facility] for bilateral orthodics.  We last wrote for them on 07/06/2023.  He was last seen in office on 06/22/2024 for bilateral metatarsalgia and plantar fasciitis.  Will he need an appointment or can we fax one in?  Patient is aware that the doctor is out and we will call him back early next week.

## 2024-04-15 ENCOUNTER — APPOINTMENT (OUTPATIENT)
Dept: RADIOLOGY | Facility: HOSPITAL | Age: 84
End: 2024-04-15
Payer: MEDICARE

## 2024-04-15 ENCOUNTER — HOSPITAL ENCOUNTER (EMERGENCY)
Facility: HOSPITAL | Age: 84
Discharge: HOME | End: 2024-04-15
Payer: MEDICARE

## 2024-04-15 ENCOUNTER — TELEPHONE (OUTPATIENT)
Dept: PRIMARY CARE | Facility: CLINIC | Age: 84
End: 2024-04-15
Payer: MEDICARE

## 2024-04-15 VITALS
RESPIRATION RATE: 18 BRPM | HEART RATE: 103 BPM | WEIGHT: 165 LBS | DIASTOLIC BLOOD PRESSURE: 76 MMHG | HEIGHT: 67 IN | BODY MASS INDEX: 25.9 KG/M2 | TEMPERATURE: 98.3 F | OXYGEN SATURATION: 95 % | SYSTOLIC BLOOD PRESSURE: 134 MMHG

## 2024-04-15 DIAGNOSIS — K80.20 CALCULUS OF GALLBLADDER WITHOUT CHOLECYSTITIS WITHOUT OBSTRUCTION: ICD-10-CM

## 2024-04-15 DIAGNOSIS — R10.30 LOWER ABDOMINAL PAIN: Primary | ICD-10-CM

## 2024-04-15 LAB
ANION GAP SERPL CALC-SCNC: 13 MMOL/L (ref 10–20)
BASOPHILS # BLD AUTO: 0.01 X10*3/UL (ref 0–0.1)
BASOPHILS NFR BLD AUTO: 0.2 %
BUN SERPL-MCNC: 34 MG/DL (ref 6–23)
CALCIUM SERPL-MCNC: 8.3 MG/DL (ref 8.6–10.3)
CHLORIDE SERPL-SCNC: 104 MMOL/L (ref 98–107)
CO2 SERPL-SCNC: 23 MMOL/L (ref 21–32)
CREAT SERPL-MCNC: 1.5 MG/DL (ref 0.5–1.3)
EGFRCR SERPLBLD CKD-EPI 2021: 46 ML/MIN/1.73M*2
EOSINOPHIL # BLD AUTO: 0.04 X10*3/UL (ref 0–0.4)
EOSINOPHIL NFR BLD AUTO: 0.7 %
ERYTHROCYTE [DISTWIDTH] IN BLOOD BY AUTOMATED COUNT: 13.7 % (ref 11.5–14.5)
GLUCOSE SERPL-MCNC: 116 MG/DL (ref 74–99)
HCT VFR BLD AUTO: 44.2 % (ref 41–52)
HGB BLD-MCNC: 15.2 G/DL (ref 13.5–17.5)
IMM GRANULOCYTES # BLD AUTO: 0.02 X10*3/UL (ref 0–0.5)
IMM GRANULOCYTES NFR BLD AUTO: 0.4 % (ref 0–0.9)
LACTATE SERPL-SCNC: 0.9 MMOL/L (ref 0.4–2)
LYMPHOCYTES # BLD AUTO: 0.36 X10*3/UL (ref 0.8–3)
LYMPHOCYTES NFR BLD AUTO: 6.4 %
MCH RBC QN AUTO: 29.9 PG (ref 26–34)
MCHC RBC AUTO-ENTMCNC: 34.4 G/DL (ref 32–36)
MCV RBC AUTO: 87 FL (ref 80–100)
MONOCYTES # BLD AUTO: 0.42 X10*3/UL (ref 0.05–0.8)
MONOCYTES NFR BLD AUTO: 7.5 %
NEUTROPHILS # BLD AUTO: 4.76 X10*3/UL (ref 1.6–5.5)
NEUTROPHILS NFR BLD AUTO: 84.8 %
NRBC BLD-RTO: 0 /100 WBCS (ref 0–0)
PLATELET # BLD AUTO: 142 X10*3/UL (ref 150–450)
POTASSIUM SERPL-SCNC: 3.7 MMOL/L (ref 3.5–5.3)
RBC # BLD AUTO: 5.09 X10*6/UL (ref 4.5–5.9)
SODIUM SERPL-SCNC: 136 MMOL/L (ref 136–145)
WBC # BLD AUTO: 5.6 X10*3/UL (ref 4.4–11.3)

## 2024-04-15 PROCEDURE — 96361 HYDRATE IV INFUSION ADD-ON: CPT

## 2024-04-15 PROCEDURE — 36415 COLL VENOUS BLD VENIPUNCTURE: CPT | Performed by: NURSE PRACTITIONER

## 2024-04-15 PROCEDURE — 2500000004 HC RX 250 GENERAL PHARMACY W/ HCPCS (ALT 636 FOR OP/ED)

## 2024-04-15 PROCEDURE — 85025 COMPLETE CBC W/AUTO DIFF WBC: CPT | Performed by: NURSE PRACTITIONER

## 2024-04-15 PROCEDURE — 74176 CT ABD & PELVIS W/O CONTRAST: CPT | Mod: FOREIGN READ | Performed by: RADIOLOGY

## 2024-04-15 PROCEDURE — 2500000004 HC RX 250 GENERAL PHARMACY W/ HCPCS (ALT 636 FOR OP/ED): Performed by: NURSE PRACTITIONER

## 2024-04-15 PROCEDURE — 96374 THER/PROPH/DIAG INJ IV PUSH: CPT

## 2024-04-15 PROCEDURE — 82435 ASSAY OF BLOOD CHLORIDE: CPT | Performed by: NURSE PRACTITIONER

## 2024-04-15 PROCEDURE — 74176 CT ABD & PELVIS W/O CONTRAST: CPT

## 2024-04-15 PROCEDURE — 83605 ASSAY OF LACTIC ACID: CPT | Performed by: NURSE PRACTITIONER

## 2024-04-15 PROCEDURE — 99284 EMERGENCY DEPT VISIT MOD MDM: CPT | Mod: 25

## 2024-04-15 RX ORDER — ONDANSETRON HYDROCHLORIDE 2 MG/ML
4 INJECTION, SOLUTION INTRAVENOUS ONCE
Status: COMPLETED | OUTPATIENT
Start: 2024-04-15 | End: 2024-04-15

## 2024-04-15 RX ORDER — ONDANSETRON HYDROCHLORIDE 2 MG/ML
INJECTION, SOLUTION INTRAVENOUS
Status: COMPLETED
Start: 2024-04-15 | End: 2024-04-15

## 2024-04-15 RX ORDER — DICYCLOMINE HYDROCHLORIDE 20 MG/1
20 TABLET ORAL 3 TIMES DAILY
Qty: 15 TABLET | Refills: 0 | Status: SHIPPED | OUTPATIENT
Start: 2024-04-15 | End: 2024-04-20

## 2024-04-15 RX ORDER — ONDANSETRON 4 MG/1
4 TABLET, ORALLY DISINTEGRATING ORAL EVERY 8 HOURS PRN
Qty: 9 TABLET | Refills: 0 | Status: SHIPPED | OUTPATIENT
Start: 2024-04-15 | End: 2024-04-18

## 2024-04-15 RX ADMIN — SODIUM CHLORIDE 1000 ML: 9 INJECTION, SOLUTION INTRAVENOUS at 13:24

## 2024-04-15 RX ADMIN — ONDANSETRON HYDROCHLORIDE 4 MG: 2 INJECTION, SOLUTION INTRAVENOUS at 16:44

## 2024-04-15 RX ADMIN — ONDANSETRON 4 MG: 2 INJECTION INTRAMUSCULAR; INTRAVENOUS at 16:44

## 2024-04-15 ASSESSMENT — COLUMBIA-SUICIDE SEVERITY RATING SCALE - C-SSRS
2. HAVE YOU ACTUALLY HAD ANY THOUGHTS OF KILLING YOURSELF?: NO
6. HAVE YOU EVER DONE ANYTHING, STARTED TO DO ANYTHING, OR PREPARED TO DO ANYTHING TO END YOUR LIFE?: NO
1. IN THE PAST MONTH, HAVE YOU WISHED YOU WERE DEAD OR WISHED YOU COULD GO TO SLEEP AND NOT WAKE UP?: NO

## 2024-04-15 ASSESSMENT — PAIN DESCRIPTION - PAIN TYPE: TYPE: ACUTE PAIN

## 2024-04-15 ASSESSMENT — PAIN - FUNCTIONAL ASSESSMENT: PAIN_FUNCTIONAL_ASSESSMENT: 0-10

## 2024-04-15 ASSESSMENT — LIFESTYLE VARIABLES
HAVE YOU EVER FELT YOU SHOULD CUT DOWN ON YOUR DRINKING: NO
EVER FELT BAD OR GUILTY ABOUT YOUR DRINKING: NO
EVER HAD A DRINK FIRST THING IN THE MORNING TO STEADY YOUR NERVES TO GET RID OF A HANGOVER: NO
HAVE PEOPLE ANNOYED YOU BY CRITICIZING YOUR DRINKING: NO
TOTAL SCORE: 0

## 2024-04-15 ASSESSMENT — PAIN DESCRIPTION - ORIENTATION: ORIENTATION: LOWER;RIGHT;LEFT

## 2024-04-15 ASSESSMENT — PAIN SCALES - GENERAL: PAINLEVEL_OUTOF10: 4

## 2024-04-15 ASSESSMENT — PAIN DESCRIPTION - LOCATION: LOCATION: ABDOMEN

## 2024-04-15 ASSESSMENT — PAIN DESCRIPTION - DESCRIPTORS: DESCRIPTORS: ACHING

## 2024-04-15 NOTE — ED PROVIDER NOTES
Chief Complaint   Patient presents with    Abdominal Pain     Started 4/13/24. Continuing since. Both left and right lower abdominal pain.     Nausea    Vomiting     Only once occurrence of emesis while sick on 4/12/24. Emesis described as undigested food.    Diarrhea     Started 4/13/24. Brown in color but later turned to black (pt states he had taken pepto bismol). No blood.       HPI       84 year old male presents to the Emergency Department today complaining of a 2-3 day history of diffuse abdominal pain that he describes as cramping in nature, constant, and varies in intensity. Notes that he has had multiple episodes of diarrhea associated with such. Denies any associated fever, chills, headache, neck pain, chest pain, shortness of breath, nausea, vomiting, constipation, hematemesis, hematochezia, melena, or urinary symptoms. No known sick contacts. No recent antibiotics.       History provided by:  Patient             Patient History   Past Medical History:   Diagnosis Date    Noninfective gastroenteritis and colitis, unspecified     Acute colitis    Other conditions influencing health status     Cataract Fragments In Both Eyes After Cataract Surgery    Other conditions influencing health status     Prostate Cancer    Other conditions influencing health status     Nephrolithiasis    Personal history of other specified conditions     History of heartburn    Personal history of other specified conditions 12/10/2021    History of elevated prostate specific antigen (PSA)     Past Surgical History:   Procedure Laterality Date    COLONOSCOPY  10/01/2013    Complete Colonoscopy    OTHER SURGICAL HISTORY  10/01/2013    Needle/Cath Placement For Brachyther Applic In Pelvic Organs    OTHER SURGICAL HISTORY  10/01/2013    Radiation Therapy    OTHER SURGICAL HISTORY  10/01/2013    Prostatectomy, Perineal Radical With Lymph Node Biopsy(S)    SHOULDER SURGERY  10/01/2013    Shoulder Surgery     Family History   Problem  Relation Name Age of Onset    Prostate cancer Other       Social History     Tobacco Use    Smoking status: Never    Smokeless tobacco: Never   Vaping Use    Vaping status: Never Used   Substance Use Topics    Alcohol use: Not Currently    Drug use: Never           Physical Exam  Constitutional:       Appearance: Normal appearance.   HENT:      Head: Normocephalic.      Right Ear: External ear normal.      Left Ear: External ear normal.      Nose: Nose normal.      Mouth/Throat:      Mouth: Mucous membranes are moist.      Pharynx: Oropharynx is clear. No oropharyngeal exudate or posterior oropharyngeal erythema.   Eyes:      Conjunctiva/sclera: Conjunctivae normal.      Pupils: Pupils are equal, round, and reactive to light.   Cardiovascular:      Rate and Rhythm: Normal rate and regular rhythm.      Pulses:           Radial pulses are 3+ on the right side and 3+ on the left side.        Dorsalis pedis pulses are 3+ on the right side and 3+ on the left side.      Heart sounds: Normal heart sounds. No murmur heard.     No friction rub. No gallop.   Pulmonary:      Effort: Pulmonary effort is normal. No respiratory distress.      Breath sounds: Normal breath sounds. No wheezing, rhonchi or rales.   Abdominal:      General: Abdomen is flat. Bowel sounds are normal.      Palpations: Abdomen is soft.      Tenderness: There is no abdominal tenderness. There is no right CVA tenderness, left CVA tenderness, guarding or rebound. Negative signs include Rosas's sign and McBurney's sign.   Musculoskeletal:         General: No swelling or deformity.      Cervical back: Full passive range of motion without pain.      Right lower leg: No edema.      Left lower leg: No edema.   Lymphadenopathy:      Cervical: No cervical adenopathy.   Skin:     Capillary Refill: Capillary refill takes less than 2 seconds.      Coloration: Skin is not jaundiced.      Findings: No rash.   Neurological:      General: No focal deficit present.       Mental Status: He is alert and oriented to person, place, and time. Mental status is at baseline.      Gait: Gait is intact.   Psychiatric:         Mood and Affect: Mood normal.         Behavior: Behavior is cooperative.         Labs Reviewed - No data to display    No orders to display            ED Course & MDM   Diagnoses as of 04/15/24 1743   Lower abdominal pain   Calculus of gallbladder without cholecystitis without obstruction           Medical Decision Making  Patient was seen and evaluated by myself in triage [facility]. History and physical was obtained and orders were placed based on such.     Nelson Teague MSN CNP    Care of the patient was assumed from previous provider who evaluated the patient in triage.  Initial workup was placed.  A saline lock was established.  Laboratory studies were drawn with results as noted.  The patient was administered 1 L normal saline bolus.  When I saw the patient the patient reported that he had feelings of wanting to vomit.  The patient began dry heaving.  He was administered Zofran 4 mg IVP.  He reported improvement of his symptoms after medication administration.  His laboratory studies showed no acute leukocytosis.  His BMP showed a glucose of 116, BUN of 34, and a creatinine of 1.50.  The patient reports that he has stage III kidney disease.  His BUN and creatinine are at his baseline. A CAT scan of the abdomen pelvis was performed and showed diverticulosis without any evidence of diverticulitis.  The patient had cholelithiasis with no evidence of cholecystitis.  There was a benign cyst of the right kidney.  And the patient has umbilicus hernia and a small inguinal hernia containing fat.  The patient was notified of the imaging and laboratory results.  He was aware of the right kidney cyst and the diverticulosis.  He was not aware of the cholelithiasis and the hernias.  The patient's repeat abdominal exams were benign.  The patient was advised to  follow-up with gastroenterology for further evaluation and treatment.  He was provided prescription for Zofran and Bentyl for home administration.  He was advised to adhere to a brat diet.  The patient may be suffering from biliary colic versus gastroenteritis.  The patient is to return if he has any worsening symptomatology.  The patient was discharged in stable condition with computer discharge instructions given.           Your medication list        CONTINUE taking these medications        Instructions Last Dose Given Next Dose Due   lancets 30 gauge misc  Commonly known as: OneTouch Delica Plus Lancet      Test bs once daily       OneTouch Verio Flex meter misc  Generic drug: blood-glucose meter                  ASK your doctor about these medications        Instructions Last Dose Given Next Dose Due   acitretin 17.5 mg capsule  Commonly known as: Soriatane           allopurinol 100 mg tablet  Commonly known as: Zyloprim      Take 1 tablet (100 mg) by mouth once daily.       amLODIPine 5 mg tablet  Commonly known as: Norvasc      Take 1 tablet (5 mg) by mouth once daily.       calcipotriene 0.005 % cream  Commonly known as: Dovonex      Use as needed to affected area       clobetasol 0.05 % external solution  Commonly known as: Temovate      Apply topically once daily.       famotidine 20 mg tablet  Commonly known as: Pepcid      Take 1 tablet (20 mg) by mouth once daily.       gabapentin 300 mg capsule  Commonly known as: Neurontin      Take 1 q am, and 1 in the afternoon and 2 each evening, total of 4 per day       ketoconazole 2 % shampoo  Commonly known as: NIZOral      Use 2-3x a week. Leave on for 5 min before rinsing.       OneTouch Verio test strips strip  Generic drug: blood sugar diagnostic      TEST BLOOD SUGAR ONCE A DAY       oxybutynin XL 10 mg 24 hr tablet  Commonly known as: Ditropan-XL      Take 1 tablet (10 mg) by mouth once daily.       potassium citrate CR 10 mEq ER tablet  Commonly known  as: Urocit-K-10      Take 1 tablet (10 mEq) by mouth 2 times a day.       rosuvastatin 20 mg tablet  Commonly known as: Crestor      Take 1 tablet (20 mg) by mouth once daily.                  Procedure  Procedures     ABDULLAHI Raphael-TRISTAN  04/15/24 1595

## 2024-04-19 ENCOUNTER — OFFICE VISIT (OUTPATIENT)
Dept: NEPHROLOGY | Facility: CLINIC | Age: 84
End: 2024-04-19
Payer: MEDICARE

## 2024-04-19 VITALS
BODY MASS INDEX: 26.3 KG/M2 | WEIGHT: 167.55 LBS | HEIGHT: 67 IN | HEART RATE: 71 BPM | DIASTOLIC BLOOD PRESSURE: 71 MMHG | SYSTOLIC BLOOD PRESSURE: 138 MMHG

## 2024-04-19 DIAGNOSIS — E11.65 TYPE 2 DIABETES MELLITUS WITH HYPERGLYCEMIA, WITHOUT LONG-TERM CURRENT USE OF INSULIN (MULTI): ICD-10-CM

## 2024-04-19 DIAGNOSIS — I10 ESSENTIAL HYPERTENSION: ICD-10-CM

## 2024-04-19 DIAGNOSIS — M10.00 IDIOPATHIC GOUT, UNSPECIFIED CHRONICITY, UNSPECIFIED SITE: Primary | ICD-10-CM

## 2024-04-19 DIAGNOSIS — N18.32 CHRONIC KIDNEY DISEASE, STAGE 3B (MULTI): ICD-10-CM

## 2024-04-19 DIAGNOSIS — N20.0 NEPHROLITHIASIS: ICD-10-CM

## 2024-04-19 DIAGNOSIS — C61 MALIGNANT NEOPLASM OF PROSTATE (MULTI): ICD-10-CM

## 2024-04-19 PROCEDURE — 1157F ADVNC CARE PLAN IN RCRD: CPT | Performed by: NURSE PRACTITIONER

## 2024-04-19 PROCEDURE — 99213 OFFICE O/P EST LOW 20 MIN: CPT | Performed by: NURSE PRACTITIONER

## 2024-04-19 PROCEDURE — 3078F DIAST BP <80 MM HG: CPT | Performed by: NURSE PRACTITIONER

## 2024-04-19 PROCEDURE — 1123F ACP DISCUSS/DSCN MKR DOCD: CPT | Performed by: NURSE PRACTITIONER

## 2024-04-19 PROCEDURE — 1159F MED LIST DOCD IN RCRD: CPT | Performed by: NURSE PRACTITIONER

## 2024-04-19 PROCEDURE — 3075F SYST BP GE 130 - 139MM HG: CPT | Performed by: NURSE PRACTITIONER

## 2024-04-19 PROCEDURE — 1160F RVW MEDS BY RX/DR IN RCRD: CPT | Performed by: NURSE PRACTITIONER

## 2024-04-19 ASSESSMENT — ENCOUNTER SYMPTOMS
HEMATOLOGIC/LYMPHATIC NEGATIVE: 1
PSYCHIATRIC NEGATIVE: 1
CARDIOVASCULAR NEGATIVE: 1
RESPIRATORY NEGATIVE: 1
MUSCULOSKELETAL NEGATIVE: 1
ENDOCRINE NEGATIVE: 1
GASTROINTESTINAL NEGATIVE: 1
NEUROLOGICAL NEGATIVE: 1
CONSTITUTIONAL NEGATIVE: 1

## 2024-04-19 NOTE — PROGRESS NOTES
History Of Present Illness  Chilo Gibbs is a 84 y.o. male with medical history significant for CKD3, HTN, DMII, gout, GERD, HLD, nephrolithiasis, prostate cancer s/p radiation therapy, and overactive bladder who presents today for a 6-month follow-up for CKD3.     Past Medical History  As above.    Surgical History  He has a past surgical history that includes Other surgical history (10/01/2013); Shoulder surgery (10/01/2013); Other surgical history (10/01/2013); Colonoscopy (10/01/2013); and Other surgical history (10/01/2013).     Social History  He reports that he has never smoked. He has never used smokeless tobacco. He reports that he does not currently use alcohol. He reports that he does not use drugs.    Family History  Family History   Problem Relation Name Age of Onset    Prostate cancer Other          Allergies  Patient has no known allergies.    Review of Systems   Constitutional: Negative.    HENT: Negative.     Respiratory: Negative.     Cardiovascular: Negative.    Gastrointestinal: Negative.    Endocrine: Negative.    Genitourinary: Negative.    Musculoskeletal: Negative.    Skin: Negative.    Neurological: Negative.    Hematological: Negative.    Psychiatric/Behavioral: Negative.          Physical Exam  Constitutional:       Appearance: Normal appearance.   HENT:      Head: Normocephalic and atraumatic.      Mouth/Throat:      Mouth: Mucous membranes are moist.   Eyes:      Pupils: Pupils are equal, round, and reactive to light.   Cardiovascular:      Rate and Rhythm: Normal rate and regular rhythm.      Pulses: Normal pulses.      Heart sounds: Normal heart sounds.   Pulmonary:      Effort: Pulmonary effort is normal.      Breath sounds: Normal breath sounds.   Musculoskeletal:         General: Normal range of motion.   Skin:     General: Skin is warm and dry.   Neurological:      General: No focal deficit present.      Mental Status: He is alert and oriented to person, place, and time.  "  Psychiatric:         Mood and Affect: Mood normal.         Behavior: Behavior normal.         Thought Content: Thought content normal.         Judgment: Judgment normal.        Last Recorded Vitals  Blood pressure 138/71, pulse 71, height 1.702 m (5' 7\"), weight 76 kg (167 lb 8.8 oz).    Relevant Results  Recent Results (from the past 2016 hour(s))   Comprehensive Metabolic Panel    Collection Time: 04/02/24  9:37 AM   Result Value Ref Range    Glucose 102 (H) 74 - 99 mg/dL    Sodium 136 136 - 145 mmol/L    Potassium 3.8 3.5 - 5.3 mmol/L    Chloride 106 98 - 107 mmol/L    Bicarbonate 25 21 - 32 mmol/L    Anion Gap 9 (L) 10 - 20 mmol/L    Urea Nitrogen 41 (H) 6 - 23 mg/dL    Creatinine 1.49 (H) 0.50 - 1.30 mg/dL    eGFR 46 (L) >60 mL/min/1.73m*2    Calcium 9.4 8.6 - 10.3 mg/dL    Albumin 4.4 3.4 - 5.0 g/dL    Alkaline Phosphatase 103 33 - 136 U/L    Total Protein 6.9 6.4 - 8.2 g/dL    AST 16 9 - 39 U/L    Bilirubin, Total 0.9 0.0 - 1.2 mg/dL    ALT 17 10 - 52 U/L   Hemoglobin A1C    Collection Time: 04/02/24  9:37 AM   Result Value Ref Range    Hemoglobin A1C 6.7 (H) see below %    Estimated Average Glucose 146 Not Established mg/dL   Lipid Panel    Collection Time: 04/02/24  9:37 AM   Result Value Ref Range    Cholesterol 111 0 - 199 mg/dL    HDL-Cholesterol 28.0 mg/dL    Cholesterol/HDL Ratio 4.0     LDL Calculated 25 <=99 mg/dL    VLDL 58 (H) 0 - 40 mg/dL    Triglycerides 290 (H) 0 - 149 mg/dL    Non HDL Cholesterol 83 0 - 149 mg/dL   CBC and Auto Differential    Collection Time: 04/15/24  1:19 PM   Result Value Ref Range    WBC 5.6 4.4 - 11.3 x10*3/uL    nRBC 0.0 0.0 - 0.0 /100 WBCs    RBC 5.09 4.50 - 5.90 x10*6/uL    Hemoglobin 15.2 13.5 - 17.5 g/dL    Hematocrit 44.2 41.0 - 52.0 %    MCV 87 80 - 100 fL    MCH 29.9 26.0 - 34.0 pg    MCHC 34.4 32.0 - 36.0 g/dL    RDW 13.7 11.5 - 14.5 %    Platelets 142 (L) 150 - 450 x10*3/uL    Neutrophils % 84.8 40.0 - 80.0 %    Immature Granulocytes %, Automated 0.4 0.0 - 0.9 " %    Lymphocytes % 6.4 13.0 - 44.0 %    Monocytes % 7.5 2.0 - 10.0 %    Eosinophils % 0.7 0.0 - 6.0 %    Basophils % 0.2 0.0 - 2.0 %    Neutrophils Absolute 4.76 1.60 - 5.50 x10*3/uL    Immature Granulocytes Absolute, Automated 0.02 0.00 - 0.50 x10*3/uL    Lymphocytes Absolute 0.36 (L) 0.80 - 3.00 x10*3/uL    Monocytes Absolute 0.42 0.05 - 0.80 x10*3/uL    Eosinophils Absolute 0.04 0.00 - 0.40 x10*3/uL    Basophils Absolute 0.01 0.00 - 0.10 x10*3/uL   Lactate    Collection Time: 04/15/24  1:19 PM   Result Value Ref Range    Lactate 0.9 0.4 - 2.0 mmol/L   Basic metabolic panel    Collection Time: 04/15/24  3:02 PM   Result Value Ref Range    Glucose 116 (H) 74 - 99 mg/dL    Sodium 136 136 - 145 mmol/L    Potassium 3.7 3.5 - 5.3 mmol/L    Chloride 104 98 - 107 mmol/L    Bicarbonate 23 21 - 32 mmol/L    Anion Gap 13 10 - 20 mmol/L    Urea Nitrogen 34 (H) 6 - 23 mg/dL    Creatinine 1.50 (H) 0.50 - 1.30 mg/dL    eGFR 46 (L) >60 mL/min/1.73m*2    Calcium 8.3 (L) 8.6 - 10.3 mg/dL         Assessment/Plan   84-year-old male with medical history significant for CKD3, HTN, DMII, gout, GERD, HLD, nephrolithiasis, prostate cancer s/p radiation therapy, and overactive bladder who presents for a 6-month fuv for CKD3.    # CKD3: baseline sCr 1.4 - 1.7 mg/dL with eGFR 41- 50 ml/min/1.73m2. 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 Most recent sCr 1.50 mg/dL with eGFR 46 ml/min/1.73m2 (4/15/24) - at baseline. Currently stable.    #HTN: well controlled with current regimen.    # DMII: well controlled. Most recent HgbA1c 6.7% (4/2/24).    # Gout: no recurrent flare-ups. On anti-gout agent.    # Nephrolithiasis: no recurrence since 2013. Continue potassium citrate. Regular f/u with Urology.    # Prostate cancer s/p radiation therapy and overactive bladder. f/u with Urology.    Plan:  - No changes with current  medications.      - Advised regular home BP checks and keep a log to bring it to next visit. Target < 130/80 mmHg. To call providers if BPs are persistently > 130/80 mmHg.  - Reviewed strategies for preserving remaining kidney function includin) Avoidance of NSAIDs including Aleve (Naprosyn), Motrin (Ibuprofen), Mobic (Meloxicam), Celebrex (Celecoxib) and aspirin as well as PPI acid blocking medications such as Prilosec (omeprazole), Protonix (pantoprazole), and Nexium (esomeprazole), as well as other nephrotoxic agents.   2) Avoidance of tobacco or alcohol use.   3) Adequate hydration daily.   4) Blood pressure target 130/80 mmHg.   5) Daily dietary sodium intake less than 2 grams per day.    6) Maintain healthy lifestyle. Healthy diet and regular exercises.   7) Maintain ideal weight.  - FUV: in 6 months or sooner if concerns arise.     Patient verbalized understanding of above. He will not hesitate to contact Division of Nephrology at [phone] with concerns/questions.     I spent 25 minutes in the professional and overall care of this patient.      Cristal Egan, APRN-CNP

## 2024-04-22 ENCOUNTER — OFFICE VISIT (OUTPATIENT)
Dept: GASTROENTEROLOGY | Facility: CLINIC | Age: 84
End: 2024-04-22
Payer: MEDICARE

## 2024-04-22 VITALS — HEIGHT: 67 IN | HEART RATE: 70 BPM | WEIGHT: 167 LBS | BODY MASS INDEX: 26.21 KG/M2

## 2024-04-22 DIAGNOSIS — K80.20 CALCULUS OF GALLBLADDER WITHOUT CHOLECYSTITIS WITHOUT OBSTRUCTION: ICD-10-CM

## 2024-04-22 DIAGNOSIS — A09 INFECTIOUS GASTROENTERITIS: Primary | ICD-10-CM

## 2024-04-22 DIAGNOSIS — K57.90 DIVERTICULOSIS: ICD-10-CM

## 2024-04-22 PROCEDURE — 1160F RVW MEDS BY RX/DR IN RCRD: CPT | Performed by: INTERNAL MEDICINE

## 2024-04-22 PROCEDURE — 1036F TOBACCO NON-USER: CPT | Performed by: INTERNAL MEDICINE

## 2024-04-22 PROCEDURE — 1157F ADVNC CARE PLAN IN RCRD: CPT | Performed by: INTERNAL MEDICINE

## 2024-04-22 PROCEDURE — 1123F ACP DISCUSS/DSCN MKR DOCD: CPT | Performed by: INTERNAL MEDICINE

## 2024-04-22 PROCEDURE — 99214 OFFICE O/P EST MOD 30 MIN: CPT | Performed by: INTERNAL MEDICINE

## 2024-04-22 PROCEDURE — 1159F MED LIST DOCD IN RCRD: CPT | Performed by: INTERNAL MEDICINE

## 2024-04-22 ASSESSMENT — ENCOUNTER SYMPTOMS: SHORTNESS OF BREATH: 0

## 2024-04-22 NOTE — PROGRESS NOTES
REASON FOR VISIT:  Change in bowel habits   PCP (requesting provider): Sandra Daniel DO.    HPI:  Chilo Gibbs is a 84 y.o. male with a past medical history of HTN, HLD, CKD, gout, nephrolithiasis, and prostate cancer s/p prostatectomy and XRT following for IBS and radiation proctitis improved on Benefiber. Colonoscopy showed single TA, diverticulosis, hemorrhoids, and radiation proctitis (1/2022). CT A/P w/out contrast showed cholelithiasis, umbilical hernia, diverticulosis, and small inguinal hernias (4/2024). Recent ED visit for abdominal pain and diarrhea and was prescribed Zofran and Dicyclomine.    The patient reports he was in the ER last Monday. He reports that two days prior to this that he started feeling unwell. He then developed diarrhea sudden onset. He spoke to PCP and was told to go to the ER. He was evaluated with CT scan. He got IVF and symptomatic management. He has recovered from this illness. He notes his CT scan showed cholelithiasis. He notes the diarrhea was green in color. He has been eating a lot of pistachio nuts. Diarrhea is resolved. He had just returned from a 4 day trip to [place] and a few people in his party got food poisoning. No recent antibiotics. No unusual foods.    PSurgHx:  -Prostatectomy   -Right inguinal hernia repair      FamHx: No GI cancer    Prior Endoscopy:  -Colonoscopy (1/2022): Excellent prep, normal TI, 4 mm ascending polyp (TA), severe sigmoid diverticulosis, small IH/EH, mild radiation proctitis in distal rectum, otherwise normal colon.  -Colonoscopy (4/2016): Normal TI, mild descending and sigmoid diverticulosis, abnormal vascularity in the distal rectum consistent with radiation proctitis, otherwise normal (no biopsy results available).  -Colonoscopy (9/2013): Moderate descending and sigmoid diverticulosis, abnormal vascularity in the rectum consistent with radiation proctitis.     PAST MEDICAL HISTORY  Past Medical History:   Diagnosis Date     Noninfective gastroenteritis and colitis, unspecified     Acute colitis    Other conditions influencing health status     Cataract Fragments In Both Eyes After Cataract Surgery    Other conditions influencing health status     Prostate Cancer    Other conditions influencing health status     Nephrolithiasis    Personal history of other specified conditions     History of heartburn    Personal history of other specified conditions 12/10/2021    History of elevated prostate specific antigen (PSA)       PAST SURGICAL HISTORY  Past Surgical History:   Procedure Laterality Date    COLONOSCOPY  10/01/2013    Complete Colonoscopy    OTHER SURGICAL HISTORY  10/01/2013    Needle/Cath Placement For Brachyther Applic In Pelvic Organs    OTHER SURGICAL HISTORY  10/01/2013    Radiation Therapy    OTHER SURGICAL HISTORY  10/01/2013    Prostatectomy, Perineal Radical With Lymph Node Biopsy(S)    SHOULDER SURGERY  10/01/2013    Shoulder Surgery       FAMILY HISTORY  Family History   Problem Relation Name Age of Onset    Prostate cancer Other         SOCIAL HISTORY   reports that he has never smoked. He has never used smokeless tobacco. He reports that he does not currently use alcohol. He reports that he does not use drugs.    REVIEW OF SYSTEMS  Review of Systems   Respiratory:  Negative for shortness of breath.    Cardiovascular:  Negative for chest pain.   All other systems reviewed and are negative.    A 10+ point review of systems was otherwise negative except as noted and per HPI.    ALLERGIES  No Known Allergies    MEDICATIONS  Current Outpatient Medications   Medication Instructions    acitretin (Soriatane) 17.5 mg capsule 1 Capsule    allopurinol (ZYLOPRIM) 100 mg, oral, Daily    amLODIPine (NORVASC) 5 mg, oral, Daily    calcipotriene (Dovonex) 0.005 % cream Use as needed to affected area    clobetasol (Temovate) 0.05 % external solution Topical, Daily    famotidine (PEPCID) 20 mg, oral, Daily    gabapentin (Neurontin) 300  mg capsule Take 1 q am, and 1 in the afternoon and 2 each evening, total of 4 per day    ketoconazole (NIZOral) 2 % shampoo Use 2-3x a week. Leave on for 5 min before rinsing.    lancets (OneTouch Delica Plus Lancet) 30 gauge misc Test bs once daily    OneTouch Verio Flex meter misc TO TEST BLOOD SUGAR    OneTouch Verio test strips strip TEST BLOOD SUGAR ONCE A DAY    oxybutynin XL (DITROPAN-XL) 10 mg, oral, Daily    potassium citrate CR (Urocit-K-10) 10 mEq ER tablet 10 mEq, oral, 2 times daily    rosuvastatin (CRESTOR) 20 mg, oral, Daily       VITALS  Vitals:    04/22/24 1401   Pulse: 70      Body mass index is 26.16 kg/m².    PHYSICAL EXAM  CONSTITUTIONAL: NAD, appears stated age  EYES: anicteric sclera, sclera clear  HEAD: normocephalic, atraumatic   NECK: supple   PULMONARY: CTAB  CARDIOVASCULAR: RRR, no M/R/G appreciated   ABDOMEN: soft, NTND, +BS, no rebound or guarding   MUSCULOSKELETAL: no edema  SKIN: no jaundice   PSYCHIATRIC: AOx3, appropriate insight and judgement    LABS  WBC   Date Value   04/15/2024 5.6 x10*3/uL   04/17/2023 8.5 x10E9/L   01/30/2023 7.2 x10E9/L   06/24/2022 5.8 x10E9/L     Hemoglobin (g/dL)   Date Value   04/15/2024 15.2   04/17/2023 14.3   01/30/2023 14.5   06/24/2022 14.9     Platelets   Date Value   04/15/2024 142 x10*3/uL (L)   04/17/2023 256 x10E9/L   01/30/2023 266 x10E9/L   06/24/2022 270 x10E9/L     Sodium (mmol/L)   Date Value   04/15/2024 136   04/02/2024 136   01/02/2024 138     Potassium (mmol/L)   Date Value   04/15/2024 3.7   04/02/2024 3.8   01/02/2024 4.0     Chloride (mmol/L)   Date Value   04/15/2024 104   04/02/2024 106   01/02/2024 104     Bicarbonate (mmol/L)   Date Value   04/15/2024 23   04/02/2024 25   01/02/2024 26     Urea Nitrogen (mg/dL)   Date Value   04/15/2024 34 (H)   04/02/2024 41 (H)   01/02/2024 36 (H)     Creatinine (mg/dL)   Date Value   04/15/2024 1.50 (H)   04/02/2024 1.49 (H)   01/02/2024 1.63 (H)     Calcium (mg/dL)   Date Value   04/15/2024  "8.3 (L)   04/02/2024 9.4   01/02/2024 9.8     Total Protein (g/dL)   Date Value   04/02/2024 6.9   01/02/2024 7.5   09/28/2023 7.3   07/06/2023 7.7   04/17/2023 7.5     Bilirubin, Total (mg/dL)   Date Value   04/02/2024 0.9   01/02/2024 0.6     Total Bilirubin (mg/dL)   Date Value   09/28/2023 0.7   07/06/2023 0.7   04/17/2023 0.6     Alkaline Phosphatase (U/L)   Date Value   04/02/2024 103   01/02/2024 105   09/28/2023 104   07/06/2023 117   04/17/2023 95     ALT (U/L)   Date Value   04/02/2024 17   01/02/2024 12     ALT (SGPT) (U/L)   Date Value   09/28/2023 16   07/06/2023 16   04/17/2023 14     AST (U/L)   Date Value   04/02/2024 16   01/02/2024 15   09/28/2023 17   07/06/2023 17   04/17/2023 15     Glucose (mg/dL)   Date Value   04/15/2024 116 (H)   04/02/2024 102 (H)   01/02/2024 98     No results found for: \"LIPASE\", \"CRP\"    ASSESSMENT/PLAN  Chilo Gibbs is a 84 y.o. male with a past medical history of HTN, HLD, CKD, gout, nephrolithiasis, and prostate cancer s/p prostatectomy and XRT following for IBS and radiation proctitis improved on Benefiber. Colonoscopy showed single TA, diverticulosis, hemorrhoids, and radiation proctitis (1/2022). CT A/P w/out contrast showed cholelithiasis, umbilical hernia, diverticulosis, and small inguinal hernias (4/2024). He had a recent sudden onset and self-limited abdominal pain and diarrheal illness in setting of travel where some of his companions did develop food poisoning. He is now feeling much better and back to baseline. This was almost assuredly an infectious gastroenteritis now resolved. I think his cholelithiasis is an incidental finding as this does not sound typical of a gallbladder attack. We will monitor for ongoing improvement at this time.    -Advised patient to monitor for any recurrent or worsening symptoms and to call the office if present   -We discussed the difference between diverticulosis and diverticulitis including the fact that it is ok to eat " seeds and nuts with diverticulosis     Follow-up in the office PRN.    Signature: Jm Wolfe MD

## 2024-04-22 NOTE — PATIENT INSTRUCTIONS
Monitor for any recurrent or worsening symptoms and if present then call the office at [phone] option 1.

## 2024-07-02 ENCOUNTER — LAB (OUTPATIENT)
Dept: LAB | Facility: LAB | Age: 84
End: 2024-07-02
Payer: MEDICARE

## 2024-07-02 DIAGNOSIS — E78.5 HYPERLIPIDEMIA, UNSPECIFIED HYPERLIPIDEMIA TYPE: ICD-10-CM

## 2024-07-02 DIAGNOSIS — N40.1 BPH WITH OBSTRUCTION/LOWER URINARY TRACT SYMPTOMS: ICD-10-CM

## 2024-07-02 DIAGNOSIS — M1A.09X0 CHRONIC GOUT OF MULTIPLE SITES, UNSPECIFIED CAUSE: ICD-10-CM

## 2024-07-02 DIAGNOSIS — N13.8 BPH WITH OBSTRUCTION/LOWER URINARY TRACT SYMPTOMS: ICD-10-CM

## 2024-07-02 DIAGNOSIS — E11.65 TYPE 2 DIABETES MELLITUS WITH HYPERGLYCEMIA, WITHOUT LONG-TERM CURRENT USE OF INSULIN (MULTI): ICD-10-CM

## 2024-07-02 LAB
ALBUMIN SERPL BCP-MCNC: 4.2 G/DL (ref 3.4–5)
ALP SERPL-CCNC: 113 U/L (ref 33–136)
ALT SERPL W P-5'-P-CCNC: 19 U/L (ref 10–52)
ANION GAP SERPL CALC-SCNC: 13 MMOL/L (ref 10–20)
AST SERPL W P-5'-P-CCNC: 20 U/L (ref 9–39)
BILIRUB SERPL-MCNC: 0.9 MG/DL (ref 0–1.2)
BUN SERPL-MCNC: 29 MG/DL (ref 6–23)
CALCIUM SERPL-MCNC: 9.7 MG/DL (ref 8.6–10.3)
CHLORIDE SERPL-SCNC: 104 MMOL/L (ref 98–107)
CHOLEST SERPL-MCNC: 125 MG/DL (ref 0–199)
CHOLESTEROL/HDL RATIO: 4.4
CO2 SERPL-SCNC: 26 MMOL/L (ref 21–32)
CREAT SERPL-MCNC: 1.45 MG/DL (ref 0.5–1.3)
EGFRCR SERPLBLD CKD-EPI 2021: 48 ML/MIN/1.73M*2
GLUCOSE SERPL-MCNC: 106 MG/DL (ref 74–99)
HDLC SERPL-MCNC: 28.1 MG/DL
LDLC SERPL CALC-MCNC: 18 MG/DL
NON HDL CHOLESTEROL: 97 MG/DL (ref 0–149)
POTASSIUM SERPL-SCNC: 4.4 MMOL/L (ref 3.5–5.3)
PROT SERPL-MCNC: 7.3 G/DL (ref 6.4–8.2)
PSA SERPL-MCNC: <0.01 NG/ML
SODIUM SERPL-SCNC: 139 MMOL/L (ref 136–145)
TRIGL SERPL-MCNC: 397 MG/DL (ref 0–149)
URATE SERPL-MCNC: 6.3 MG/DL (ref 4–7.5)
VLDL: 79 MG/DL (ref 0–40)

## 2024-07-02 PROCEDURE — 80053 COMPREHEN METABOLIC PANEL: CPT

## 2024-07-02 PROCEDURE — 36415 COLL VENOUS BLD VENIPUNCTURE: CPT

## 2024-07-02 PROCEDURE — 84550 ASSAY OF BLOOD/URIC ACID: CPT

## 2024-07-02 PROCEDURE — 80061 LIPID PANEL: CPT

## 2024-07-02 PROCEDURE — 83036 HEMOGLOBIN GLYCOSYLATED A1C: CPT

## 2024-07-02 PROCEDURE — 84153 ASSAY OF PSA TOTAL: CPT

## 2024-07-03 LAB
EST. AVERAGE GLUCOSE BLD GHB EST-MCNC: 137 MG/DL
HBA1C MFR BLD: 6.4 %

## 2024-07-05 ENCOUNTER — APPOINTMENT (OUTPATIENT)
Dept: PRIMARY CARE | Facility: CLINIC | Age: 84
End: 2024-07-05
Payer: MEDICARE

## 2024-07-05 VITALS
BODY MASS INDEX: 26.37 KG/M2 | DIASTOLIC BLOOD PRESSURE: 74 MMHG | SYSTOLIC BLOOD PRESSURE: 128 MMHG | RESPIRATION RATE: 18 BRPM | HEART RATE: 67 BPM | OXYGEN SATURATION: 93 % | HEIGHT: 67 IN | WEIGHT: 168 LBS | TEMPERATURE: 97.6 F

## 2024-07-05 DIAGNOSIS — E11.65 TYPE 2 DIABETES MELLITUS WITH HYPERGLYCEMIA, WITHOUT LONG-TERM CURRENT USE OF INSULIN (MULTI): Primary | ICD-10-CM

## 2024-07-05 DIAGNOSIS — K21.9 GASTROESOPHAGEAL REFLUX DISEASE WITHOUT ESOPHAGITIS: ICD-10-CM

## 2024-07-05 DIAGNOSIS — N20.0 KIDNEY STONES: ICD-10-CM

## 2024-07-05 DIAGNOSIS — N40.1 BPH WITH OBSTRUCTION/LOWER URINARY TRACT SYMPTOMS: ICD-10-CM

## 2024-07-05 DIAGNOSIS — G63 POLYNEUROPATHY ASSOCIATED WITH UNDERLYING DISEASE (MULTI): ICD-10-CM

## 2024-07-05 DIAGNOSIS — I10 ESSENTIAL HYPERTENSION: ICD-10-CM

## 2024-07-05 DIAGNOSIS — N13.8 BPH WITH OBSTRUCTION/LOWER URINARY TRACT SYMPTOMS: ICD-10-CM

## 2024-07-05 DIAGNOSIS — N18.32 CHRONIC KIDNEY DISEASE, STAGE 3B (MULTI): ICD-10-CM

## 2024-07-05 DIAGNOSIS — L40.0 PSORIASIS VULGARIS: ICD-10-CM

## 2024-07-05 DIAGNOSIS — E78.5 HYPERLIPIDEMIA, UNSPECIFIED HYPERLIPIDEMIA TYPE: ICD-10-CM

## 2024-07-05 DIAGNOSIS — M1A.09X0 CHRONIC GOUT OF MULTIPLE SITES, UNSPECIFIED CAUSE: ICD-10-CM

## 2024-07-05 PROBLEM — N18.5 CHRONIC KIDNEY DISEASE, STAGE 5 (MULTI): Status: RESOLVED | Noted: 2023-04-20 | Resolved: 2024-07-05

## 2024-07-05 PROCEDURE — 1126F AMNT PAIN NOTED NONE PRSNT: CPT | Performed by: FAMILY MEDICINE

## 2024-07-05 PROCEDURE — 3078F DIAST BP <80 MM HG: CPT | Performed by: FAMILY MEDICINE

## 2024-07-05 PROCEDURE — 1159F MED LIST DOCD IN RCRD: CPT | Performed by: FAMILY MEDICINE

## 2024-07-05 PROCEDURE — 1123F ACP DISCUSS/DSCN MKR DOCD: CPT | Performed by: FAMILY MEDICINE

## 2024-07-05 PROCEDURE — 3074F SYST BP LT 130 MM HG: CPT | Performed by: FAMILY MEDICINE

## 2024-07-05 PROCEDURE — 1157F ADVNC CARE PLAN IN RCRD: CPT | Performed by: FAMILY MEDICINE

## 2024-07-05 PROCEDURE — G2211 COMPLEX E/M VISIT ADD ON: HCPCS | Performed by: FAMILY MEDICINE

## 2024-07-05 PROCEDURE — 1160F RVW MEDS BY RX/DR IN RCRD: CPT | Performed by: FAMILY MEDICINE

## 2024-07-05 PROCEDURE — 1036F TOBACCO NON-USER: CPT | Performed by: FAMILY MEDICINE

## 2024-07-05 PROCEDURE — 99214 OFFICE O/P EST MOD 30 MIN: CPT | Performed by: FAMILY MEDICINE

## 2024-07-05 RX ORDER — KETOCONAZOLE 20 MG/ML
SHAMPOO, SUSPENSION TOPICAL
Qty: 120 ML | Refills: 2 | Status: SHIPPED | OUTPATIENT
Start: 2024-07-05

## 2024-07-05 RX ORDER — ROSUVASTATIN CALCIUM 20 MG/1
20 TABLET, COATED ORAL DAILY
Qty: 90 TABLET | Refills: 1 | Status: SHIPPED | OUTPATIENT
Start: 2024-07-05 | End: 2025-01-01

## 2024-07-05 RX ORDER — ALLOPURINOL 100 MG/1
100 TABLET ORAL DAILY
Qty: 90 TABLET | Refills: 1 | Status: SHIPPED | OUTPATIENT
Start: 2024-07-05 | End: 2025-01-01

## 2024-07-05 RX ORDER — AMLODIPINE BESYLATE 5 MG/1
5 TABLET ORAL DAILY
Qty: 90 TABLET | Refills: 1 | Status: SHIPPED | OUTPATIENT
Start: 2024-07-05 | End: 2025-01-01

## 2024-07-05 RX ORDER — OXYBUTYNIN CHLORIDE 10 MG/1
10 TABLET, EXTENDED RELEASE ORAL DAILY
Qty: 90 TABLET | Refills: 1 | Status: SHIPPED | OUTPATIENT
Start: 2024-07-05 | End: 2025-01-01

## 2024-07-05 RX ORDER — FAMOTIDINE 20 MG/1
20 TABLET, FILM COATED ORAL DAILY
Qty: 90 TABLET | Refills: 1 | Status: SHIPPED | OUTPATIENT
Start: 2024-07-05 | End: 2025-01-01

## 2024-07-05 RX ORDER — GABAPENTIN 300 MG/1
CAPSULE ORAL
Qty: 120 CAPSULE | Refills: 2 | Status: SHIPPED | OUTPATIENT
Start: 2024-07-05

## 2024-07-05 RX ORDER — POTASSIUM CITRATE 10 MEQ/1
10 TABLET, EXTENDED RELEASE ORAL 2 TIMES DAILY
Qty: 180 TABLET | Refills: 1 | Status: SHIPPED | OUTPATIENT
Start: 2024-07-05 | End: 2025-01-01

## 2024-07-05 ASSESSMENT — PATIENT HEALTH QUESTIONNAIRE - PHQ9
2. FEELING DOWN, DEPRESSED OR HOPELESS: NOT AT ALL
SUM OF ALL RESPONSES TO PHQ9 QUESTIONS 1 AND 2: 0
1. LITTLE INTEREST OR PLEASURE IN DOING THINGS: NOT AT ALL

## 2024-07-05 ASSESSMENT — PAIN SCALES - GENERAL: PAINLEVEL: 0-NO PAIN

## 2024-07-05 NOTE — PROGRESS NOTES
Subjective   Patient ID: Chilo Gibbs is a 84 y.o. male who presents for 3 MONTH F/UP.    HPI   The patient presents to the clinic for a 3-month follow-up. He has past medical history of hypertension, hyperlipidemia, hypertriglyceridemia, bilateral sensorineural hearing loss, type 2 diabetes mellitus, GERD, abdominal bloating, CKD (stage 5), ED, nephrolithiasis, BPH, renal insufficiency, urinary urgency, malignant neoplasm of prostate, malignant neoplasm of skin, gout, osteoarthritis of several sites, psoriasis, and seborrheic keratosis.    He has been compliant with current medication and denies any side-effects to current medication.    The patient had labs done on 07/02/2024 and requests to have them reviewed.     The patient reports that he has been suffering from mild post-nasal drainage, prior onset ~2 weeks ago. He does suffer from seasonal allergies around this time of year. He has been taking OTC Tylenol for treatment of this condition.    His blood pressure (128/74) was within normal range when checked in the clinic today. He continues on amlodipine 5 mg daily for treatment of hypertension. He denies any side-effects to his amlodipine medication.    He states he is feeling well, no complaints today.  Needs meds refilled.  Discussed labs, he states he realizes his TG are up and has not been trying as hard with his diet and will try to do better.    He takes rosuvastatin 20 mg daily for treatment of hyperlipidemia. He is tolerating this dose of rosuvastatin medication well. He denies any side-effects to his rosuvastatin medication.    He continues on gabapentin 300 mg (4 times daily) for treatment of neuropathic pain symptoms. He denies any side-effects to his gabapentin medication.    He also continues on allopurinol 100 mg daily for treatment/prevention of gout. He denies any side-effects to his allopurinol medication.  His gfr has been stable, no gout flares    Bp is good range.  No CP or SOB    Gerd  "controlled with generic pepcid    Review of Systems    Objective   /74   Pulse 67   Temp 36.4 °C (97.6 °F)   Resp 18   Ht 1.702 m (5' 7\")   Wt 76.2 kg (168 lb)   SpO2 93%   BMI 26.31 kg/m²     Physical Exam  Neck:      Vascular: No carotid bruit.   Cardiovascular:      Rate and Rhythm: Normal rate and regular rhythm.      Pulses: Normal pulses.      Heart sounds: Normal heart sounds.   Pulmonary:      Effort: Pulmonary effort is normal.      Breath sounds: Normal breath sounds.   Musculoskeletal:         General: Normal range of motion.      Cervical back: Normal range of motion.      Right lower leg: No edema.      Left lower leg: No edema.   Skin:     General: Skin is warm and dry.   Neurological:      Mental Status: He is alert and oriented to person, place, and time.   Psychiatric:         Mood and Affect: Mood normal.         Thought Content: Thought content normal.         Judgment: Judgment normal.         Assessment/Plan   Problem List Items Addressed This Visit             ICD-10-CM    Chronic kidney disease, stage 3b (Multi) N18.32    Essential hypertension I10    Relevant Medications    amLODIPine (Norvasc) 5 mg tablet    GERD (gastroesophageal reflux disease) K21.9    Relevant Medications    famotidine (Pepcid) 20 mg tablet    Gout M10.9    Relevant Medications    allopurinol (Zyloprim) 100 mg tablet    Peripheral neuropathy G62.9    Relevant Medications    gabapentin (Neurontin) 300 mg capsule    BPH with obstruction/lower urinary tract symptoms N40.1, N13.8    Relevant Medications    oxybutynin XL (Ditropan-XL) 10 mg 24 hr tablet    Hyperlipidemia E78.5    Relevant Medications    rosuvastatin (Crestor) 20 mg tablet    Other Relevant Orders    Lipid Panel    Type 2 diabetes mellitus with hyperglycemia, without long-term current use of insulin (Multi) - Primary E11.65    Relevant Orders    Comprehensive Metabolic Panel    Hemoglobin A1C     Other Visit Diagnoses         Codes    Kidney " stones     N20.0    Relevant Medications    potassium citrate CR (Urocit-K-10) 10 mEq ER tablet    Psoriasis vulgaris     L40.0    Relevant Medications    ketoconazole (NIZOral) 2 % shampoo               The patient's labs (07/02/2024) were reviewed. Chemistries/electrolytes were mostly WNL, but FBS (106) was slightly elevated. Kidney function markers were mildly abnormal (urea nitrogen 29/creatinine 1.45/eGFR 48). Liver enzymes were WNL. His hemoglobin A1C was 6.4 % (pre-diabetic range). Cholesterol profile was mostly within normal range (total cholesterol 125/HDL 28.1/LDL 18), but triglycerides (397) were elevaetd. His PSA and uric acid were within normal range. He was instructed to start eating a healthy diet (low in carbohydrates and low in processed fats) with regular exercise. Repeat labs (CMP, A1C, lipid panel) were ordered for the patient to complete before his next visit (~3 months).    The patient received refills for current medication (allopurinol 100 mg, amlodipine 5 mg, famotidine 20 mg, gabapentin 300 mg, ketoconazole 2 % shampoo, oxybutynin XL 10 mg, potassium citrate CR 10 mEq, rosuvastatin 20 mg). He was instructed to continue taking medication as previously directed. In regards to his gabapentin prescription, OARRS were reviewed. Risk to benefit ratio was assessed. No suspicious activity was observed. His most recent UDS/CSA was done on 01/04/2024.    In regards to concerns with post-nasal drainage, the patient was instructed to try using an OTC nasal spray (Flonase or Nasacort) for treatment of this condition. Continue monitoring symptoms for improvement/exacerbation.    He will follow-up in 3 months, unless otherwise needed.    Scribe Attestation  By signing my name below, IRicardo Scribe   attest that this documentation has been prepared under the direction and in the presence of Sandra Daniel DO.

## 2024-09-24 ENCOUNTER — LAB (OUTPATIENT)
Dept: LAB | Facility: LAB | Age: 84
End: 2024-09-24
Payer: MEDICARE

## 2024-09-24 DIAGNOSIS — E11.65 TYPE 2 DIABETES MELLITUS WITH HYPERGLYCEMIA, WITHOUT LONG-TERM CURRENT USE OF INSULIN: ICD-10-CM

## 2024-09-24 DIAGNOSIS — E78.5 HYPERLIPIDEMIA, UNSPECIFIED HYPERLIPIDEMIA TYPE: ICD-10-CM

## 2024-09-24 LAB
ALBUMIN SERPL BCP-MCNC: 4.3 G/DL (ref 3.4–5)
ALP SERPL-CCNC: 111 U/L (ref 33–136)
ALT SERPL W P-5'-P-CCNC: 16 U/L (ref 10–52)
ANION GAP SERPL CALC-SCNC: 12 MMOL/L (ref 10–20)
AST SERPL W P-5'-P-CCNC: 17 U/L (ref 9–39)
BILIRUB SERPL-MCNC: 0.7 MG/DL (ref 0–1.2)
BUN SERPL-MCNC: 28 MG/DL (ref 6–23)
CALCIUM SERPL-MCNC: 9.3 MG/DL (ref 8.6–10.3)
CHLORIDE SERPL-SCNC: 106 MMOL/L (ref 98–107)
CHOLEST SERPL-MCNC: 103 MG/DL (ref 0–199)
CHOLESTEROL/HDL RATIO: 3.6
CO2 SERPL-SCNC: 25 MMOL/L (ref 21–32)
CREAT SERPL-MCNC: 1.4 MG/DL (ref 0.5–1.3)
EGFRCR SERPLBLD CKD-EPI 2021: 50 ML/MIN/1.73M*2
GLUCOSE SERPL-MCNC: 106 MG/DL (ref 74–99)
HDLC SERPL-MCNC: 28.4 MG/DL
LDLC SERPL CALC-MCNC: 18 MG/DL
NON HDL CHOLESTEROL: 75 MG/DL (ref 0–149)
POTASSIUM SERPL-SCNC: 4 MMOL/L (ref 3.5–5.3)
PROT SERPL-MCNC: 7.2 G/DL (ref 6.4–8.2)
SODIUM SERPL-SCNC: 139 MMOL/L (ref 136–145)
TRIGL SERPL-MCNC: 284 MG/DL (ref 0–149)
VLDL: 57 MG/DL (ref 0–40)

## 2024-09-24 PROCEDURE — 80061 LIPID PANEL: CPT

## 2024-09-24 PROCEDURE — 36415 COLL VENOUS BLD VENIPUNCTURE: CPT

## 2024-09-24 PROCEDURE — 83036 HEMOGLOBIN GLYCOSYLATED A1C: CPT

## 2024-09-24 PROCEDURE — 80053 COMPREHEN METABOLIC PANEL: CPT

## 2024-09-25 LAB
EST. AVERAGE GLUCOSE BLD GHB EST-MCNC: 128 MG/DL
HBA1C MFR BLD: 6.1 %

## 2024-10-04 ENCOUNTER — APPOINTMENT (OUTPATIENT)
Dept: PRIMARY CARE | Facility: CLINIC | Age: 84
End: 2024-10-04
Payer: MEDICARE

## 2024-10-04 VITALS
BODY MASS INDEX: 26.53 KG/M2 | HEIGHT: 67 IN | SYSTOLIC BLOOD PRESSURE: 122 MMHG | TEMPERATURE: 97.4 F | HEART RATE: 74 BPM | WEIGHT: 169 LBS | RESPIRATION RATE: 18 BRPM | OXYGEN SATURATION: 96 % | DIASTOLIC BLOOD PRESSURE: 78 MMHG

## 2024-10-04 DIAGNOSIS — N20.0 KIDNEY STONES: ICD-10-CM

## 2024-10-04 DIAGNOSIS — E11.65 TYPE 2 DIABETES MELLITUS WITH HYPERGLYCEMIA, WITHOUT LONG-TERM CURRENT USE OF INSULIN: Primary | ICD-10-CM

## 2024-10-04 DIAGNOSIS — N13.8 BPH WITH OBSTRUCTION/LOWER URINARY TRACT SYMPTOMS: ICD-10-CM

## 2024-10-04 DIAGNOSIS — I10 ESSENTIAL HYPERTENSION: ICD-10-CM

## 2024-10-04 DIAGNOSIS — N40.1 BPH WITH OBSTRUCTION/LOWER URINARY TRACT SYMPTOMS: ICD-10-CM

## 2024-10-04 DIAGNOSIS — M1A.09X0 CHRONIC GOUT OF MULTIPLE SITES, UNSPECIFIED CAUSE: ICD-10-CM

## 2024-10-04 DIAGNOSIS — G63 POLYNEUROPATHY ASSOCIATED WITH UNDERLYING DISEASE (MULTI): ICD-10-CM

## 2024-10-04 DIAGNOSIS — Z23 ENCOUNTER FOR IMMUNIZATION: ICD-10-CM

## 2024-10-04 DIAGNOSIS — K21.9 GASTROESOPHAGEAL REFLUX DISEASE WITHOUT ESOPHAGITIS: ICD-10-CM

## 2024-10-04 DIAGNOSIS — E78.5 HYPERLIPIDEMIA, UNSPECIFIED HYPERLIPIDEMIA TYPE: ICD-10-CM

## 2024-10-04 PROCEDURE — 3078F DIAST BP <80 MM HG: CPT | Performed by: FAMILY MEDICINE

## 2024-10-04 PROCEDURE — G2211 COMPLEX E/M VISIT ADD ON: HCPCS | Performed by: FAMILY MEDICINE

## 2024-10-04 PROCEDURE — 1123F ACP DISCUSS/DSCN MKR DOCD: CPT | Performed by: FAMILY MEDICINE

## 2024-10-04 PROCEDURE — 1126F AMNT PAIN NOTED NONE PRSNT: CPT | Performed by: FAMILY MEDICINE

## 2024-10-04 PROCEDURE — 3074F SYST BP LT 130 MM HG: CPT | Performed by: FAMILY MEDICINE

## 2024-10-04 PROCEDURE — 99214 OFFICE O/P EST MOD 30 MIN: CPT | Performed by: FAMILY MEDICINE

## 2024-10-04 PROCEDURE — 1036F TOBACCO NON-USER: CPT | Performed by: FAMILY MEDICINE

## 2024-10-04 PROCEDURE — G0008 ADMIN INFLUENZA VIRUS VAC: HCPCS | Performed by: FAMILY MEDICINE

## 2024-10-04 PROCEDURE — 1157F ADVNC CARE PLAN IN RCRD: CPT | Performed by: FAMILY MEDICINE

## 2024-10-04 PROCEDURE — 1159F MED LIST DOCD IN RCRD: CPT | Performed by: FAMILY MEDICINE

## 2024-10-04 PROCEDURE — 90662 IIV NO PRSV INCREASED AG IM: CPT | Performed by: FAMILY MEDICINE

## 2024-10-04 RX ORDER — BLOOD-GLUCOSE METER
EACH MISCELLANEOUS
Qty: 100 STRIP | Refills: 3 | Status: SHIPPED | OUTPATIENT
Start: 2024-10-04

## 2024-10-04 RX ORDER — FAMOTIDINE 20 MG/1
20 TABLET, FILM COATED ORAL DAILY
Qty: 90 TABLET | Refills: 1 | Status: SHIPPED | OUTPATIENT
Start: 2024-10-04 | End: 2025-04-02

## 2024-10-04 RX ORDER — GABAPENTIN 300 MG/1
CAPSULE ORAL
Qty: 120 CAPSULE | Refills: 2 | Status: SHIPPED | OUTPATIENT
Start: 2024-10-04

## 2024-10-04 RX ORDER — OXYBUTYNIN CHLORIDE 10 MG/1
10 TABLET, EXTENDED RELEASE ORAL DAILY
Qty: 90 TABLET | Refills: 1 | Status: SHIPPED | OUTPATIENT
Start: 2024-10-04 | End: 2025-04-02

## 2024-10-04 RX ORDER — POTASSIUM CITRATE 10 MEQ/1
10 TABLET, EXTENDED RELEASE ORAL 2 TIMES DAILY
Qty: 180 TABLET | Refills: 1 | Status: SHIPPED | OUTPATIENT
Start: 2024-10-04 | End: 2025-04-02

## 2024-10-04 RX ORDER — AMLODIPINE BESYLATE 5 MG/1
5 TABLET ORAL DAILY
Qty: 90 TABLET | Refills: 1 | Status: SHIPPED | OUTPATIENT
Start: 2024-10-04 | End: 2025-04-02

## 2024-10-04 RX ORDER — ROSUVASTATIN CALCIUM 20 MG/1
20 TABLET, COATED ORAL DAILY
Qty: 90 TABLET | Refills: 1 | Status: SHIPPED | OUTPATIENT
Start: 2024-10-04 | End: 2025-04-02

## 2024-10-04 RX ORDER — ALLOPURINOL 100 MG/1
100 TABLET ORAL DAILY
Qty: 90 TABLET | Refills: 1 | Status: SHIPPED | OUTPATIENT
Start: 2024-10-04 | End: 2025-04-02

## 2024-10-04 ASSESSMENT — PAIN SCALES - GENERAL: PAINLEVEL: 0-NO PAIN

## 2024-10-04 NOTE — PROGRESS NOTES
Subjective   Patient ID: Chilo Gibbs is a 84 y.o. male who presents for 3 month f/up.    HPI   The patient presents to the clinic for a 3-month follow-up. He has past medical history of hypertension, hyperlipidemia, hypertriglyceridemia, bilateral sensorineural hearing loss, type 2 diabetes mellitus, GERD, abdominal bloating, CKD (stage 5), ED, nephrolithiasis, BPH, renal insufficiency, urinary urgency, malignant neoplasm of prostate, malignant neoplasm of skin, gout, osteoarthritis of several sites, psoriasis, and seborrheic keratosis.    The patient had labs done on 09/24/2024 and requests to have them reviewed.    His blood pressure (122/78) was within good range when checked in the clinic today. He continues on amlodipine 5 mg daily for treatment of hypertension. He denies any side-effects to his amlodipine medication. He denies any chest pain and/or SOB symptoms.  Checks bp at home, running well per pt, no dizziness or lightheadedness    He takes rosuvastatin 20 mg daily for treatment of hyperlipidemia. He is tolerating this dose of rosuvastatin medication well. He denies any side-effects to his rosuvastatin medication. His most recent cholesterol profile was mostly normal (total cholesterol 103/HDL 28.4/LDL 18/triglycerides 284).    He takes allopurinol 100 mg daily for treatment of chronic gout (multiple sites). His allopurinol medication is working well to control his gout symptoms. He is on famotidine 20 mg daily for treatment of GERD. His GERD has been controlled with famotidine medication. He continues on gabapentin 300 mg daily (4 tablets total daily) for treatment of polyneuropathy. He takes oxybutynin XL 10 mg daily for treatment of BPH. He is on potassium citrate CR 10 mEq supplement daily for treatment/prevention of kidney stones. He denies any side-effects to any of these medications.    Review of Systems    Objective   /78   Pulse 74   Temp 36.3 °C (97.4 °F)   Resp 18   Ht 1.702 m (5'  "7\")   Wt 76.7 kg (169 lb)   SpO2 96%   BMI 26.47 kg/m²     Physical Exam  Neck:      Vascular: No carotid bruit.   Cardiovascular:      Rate and Rhythm: Normal rate and regular rhythm.      Pulses: Normal pulses.      Heart sounds: Normal heart sounds.   Pulmonary:      Effort: Pulmonary effort is normal.      Breath sounds: Normal breath sounds.   Musculoskeletal:         General: Normal range of motion.      Cervical back: Normal range of motion.      Right lower leg: No edema.      Left lower leg: No edema.   Lymphadenopathy:      Cervical: No cervical adenopathy.   Skin:     General: Skin is warm and dry.   Neurological:      Mental Status: He is alert and oriented to person, place, and time.   Psychiatric:         Mood and Affect: Mood normal.         Thought Content: Thought content normal.         Judgment: Judgment normal.         Assessment/Plan   Problem List Items Addressed This Visit             ICD-10-CM    Essential hypertension I10    Relevant Medications    amLODIPine (Norvasc) 5 mg tablet    GERD (gastroesophageal reflux disease) K21.9    Relevant Medications    famotidine (Pepcid) 20 mg tablet    Gout M10.9    Relevant Medications    allopurinol (Zyloprim) 100 mg tablet    Peripheral neuropathy G62.9    Relevant Medications    gabapentin (Neurontin) 300 mg capsule    BPH with obstruction/lower urinary tract symptoms N40.1, N13.8    Relevant Medications    oxybutynin XL (Ditropan-XL) 10 mg 24 hr tablet    Hyperlipidemia E78.5    Relevant Medications    rosuvastatin (Crestor) 20 mg tablet    Type 2 diabetes mellitus with hyperglycemia, without long-term current use of insulin - Primary E11.65    Relevant Medications    OneTouch Verio test strips strip     Other Visit Diagnoses         Codes    Kidney stones     N20.0    Relevant Medications    potassium citrate CR (Urocit-K-10) 10 mEq ER tablet    Encounter for immunization     Z23    Relevant Orders    Flu vaccine, trivalent, preservative free, " "HIGH-DOSE, age 65y+ (Fluzone) (Completed)                 The patient's labs (09/24/2024) were reviewed. Cholesterol profile was mostly WNL (total cholesterol 103/HDL 28.4/LDL 18), but his triglycerides (284) were elevated. His hemoglobin A1C was 6.1 % (pre-diabetic range). Notably, his hemoglobin A1C has improved from 6.4 % to 6.1 % over the past 3 months. His chemistries and electrolytes were mostly WNL, but FBS (106) was slightly elevated. His kidney function markers were mildly abnormal (urea nitrogen 28/creatinine 140/eGFR 50) but stable. His liver enzymes were within normal range. He was instructed to continue eating a low-carbohydrate diet with regular exercise.  Answered questions about fat intake in diet also, pt is trying to make changes, states weight \"creeping up\"    The patient received refills for current medication (allopurinol 100 mg, amlodipine 5 mg, famotidine 20 mg, gabapentin 300 mg, oxybutynin XL 10 mg, potassium citrate CR 10 mEq, rosuvastatin 20 mg). He was instructed to continue taking medication as previously directed. In regards to his gabapentin prescription, OARRS were reviewed. Risk to benefit ratio was assessed. No suspicious activity was observed. His most recent UDS/CSA was done on 01/04/2024.    Prospective immunizations were discussed with the patient. He received his annual flu vaccine (high-dose) in the clinic today. He will consider receiving his annual COVID-19 booster from his local pharmacy in the near future.    He will follow-up in 3 months, unless otherwise needed.    Scribe Attestation  By signing my name below, I, Cedric Markham   attest that this documentation has been prepared under the direction and in the presence of Sandra Daniel DO.    "

## 2024-10-25 ENCOUNTER — APPOINTMENT (OUTPATIENT)
Dept: NEPHROLOGY | Facility: CLINIC | Age: 84
End: 2024-10-25
Payer: MEDICARE

## 2024-10-25 VITALS
OXYGEN SATURATION: 95 % | HEIGHT: 67 IN | WEIGHT: 172.84 LBS | BODY MASS INDEX: 27.13 KG/M2 | HEART RATE: 68 BPM | DIASTOLIC BLOOD PRESSURE: 68 MMHG | SYSTOLIC BLOOD PRESSURE: 127 MMHG

## 2024-10-25 DIAGNOSIS — N20.0 NEPHROLITHIASIS: ICD-10-CM

## 2024-10-25 DIAGNOSIS — I10 ESSENTIAL HYPERTENSION: ICD-10-CM

## 2024-10-25 DIAGNOSIS — N18.30 STAGE 3 CHRONIC KIDNEY DISEASE, UNSPECIFIED WHETHER STAGE 3A OR 3B CKD (MULTI): ICD-10-CM

## 2024-10-25 DIAGNOSIS — E11.65 TYPE 2 DIABETES MELLITUS WITH HYPERGLYCEMIA, WITHOUT LONG-TERM CURRENT USE OF INSULIN: ICD-10-CM

## 2024-10-25 DIAGNOSIS — M10.00 IDIOPATHIC GOUT, UNSPECIFIED CHRONICITY, UNSPECIFIED SITE: Primary | ICD-10-CM

## 2024-10-25 DIAGNOSIS — C61 MALIGNANT NEOPLASM OF PROSTATE (MULTI): ICD-10-CM

## 2024-10-25 PROCEDURE — 3074F SYST BP LT 130 MM HG: CPT | Performed by: NURSE PRACTITIONER

## 2024-10-25 PROCEDURE — 99213 OFFICE O/P EST LOW 20 MIN: CPT | Performed by: NURSE PRACTITIONER

## 2024-10-25 PROCEDURE — 1036F TOBACCO NON-USER: CPT | Performed by: NURSE PRACTITIONER

## 2024-10-25 PROCEDURE — 1157F ADVNC CARE PLAN IN RCRD: CPT | Performed by: NURSE PRACTITIONER

## 2024-10-25 PROCEDURE — 1159F MED LIST DOCD IN RCRD: CPT | Performed by: NURSE PRACTITIONER

## 2024-10-25 PROCEDURE — 1123F ACP DISCUSS/DSCN MKR DOCD: CPT | Performed by: NURSE PRACTITIONER

## 2024-10-25 PROCEDURE — 3078F DIAST BP <80 MM HG: CPT | Performed by: NURSE PRACTITIONER

## 2024-10-25 ASSESSMENT — ENCOUNTER SYMPTOMS
ENDOCRINE NEGATIVE: 1
EYES NEGATIVE: 1
HEMATOLOGIC/LYMPHATIC NEGATIVE: 1
GASTROINTESTINAL NEGATIVE: 1
CARDIOVASCULAR NEGATIVE: 1
NEUROLOGICAL NEGATIVE: 1
MUSCULOSKELETAL NEGATIVE: 1
PSYCHIATRIC NEGATIVE: 1
RESPIRATORY NEGATIVE: 1
CONSTITUTIONAL NEGATIVE: 1

## 2024-10-25 NOTE — PROGRESS NOTES
History Of Present Illness  Chilo Gibbs is a 84 y.o. male with medical history significant for CKD3, HTN, DMII, gout, GERD, HLD, nephrolithiasis, prostate cancer s/p radiation therapy, and overactive bladder who presents for a 6-month fuv for CKD3.     Past Medical History  As above.    Surgical History  He has a past surgical history that includes Other surgical history (10/01/2013); Shoulder surgery (10/01/2013); Other surgical history (10/01/2013); Colonoscopy (10/01/2013); and Other surgical history (10/01/2013).     Social History  He reports that he has never smoked. He has never used smokeless tobacco. He reports that he does not currently use alcohol. He reports that he does not use drugs.    Family History  Family History   Problem Relation Name Age of Onset    Prostate cancer Other          Allergies  Patient has no known allergies.    Review of Systems   Constitutional: Negative.    HENT: Negative.     Eyes: Negative.    Respiratory: Negative.     Cardiovascular: Negative.    Gastrointestinal: Negative.    Endocrine: Negative.    Genitourinary: Negative.    Musculoskeletal: Negative.    Skin: Negative.    Neurological: Negative.    Hematological: Negative.    Psychiatric/Behavioral: Negative.          Physical Exam  Vitals reviewed.   Constitutional:       Appearance: Normal appearance.   HENT:      Head: Normocephalic and atraumatic.      Mouth/Throat:      Mouth: Mucous membranes are moist.   Cardiovascular:      Rate and Rhythm: Normal rate and regular rhythm.      Pulses: Normal pulses.      Heart sounds: Normal heart sounds.   Pulmonary:      Effort: Pulmonary effort is normal.      Breath sounds: Normal breath sounds.   Musculoskeletal:         General: Normal range of motion.   Skin:     General: Skin is warm and dry.   Neurological:      General: No focal deficit present.      Mental Status: He is alert and oriented to person, place, and time.   Psychiatric:         Mood and Affect: Mood  "normal.         Behavior: Behavior normal.         Thought Content: Thought content normal.         Judgment: Judgment normal.          Last Recorded Vitals  Blood pressure 127/68, pulse 68, height 1.702 m (5' 7\"), weight 78.4 kg (172 lb 13.5 oz), SpO2 95%.    Relevant Results  Recent Results (from the past 5 weeks)   Comprehensive Metabolic Panel    Collection Time: 09/24/24  9:23 AM   Result Value Ref Range    Glucose 106 (H) 74 - 99 mg/dL    Sodium 139 136 - 145 mmol/L    Potassium 4.0 3.5 - 5.3 mmol/L    Chloride 106 98 - 107 mmol/L    Bicarbonate 25 21 - 32 mmol/L    Anion Gap 12 10 - 20 mmol/L    Urea Nitrogen 28 (H) 6 - 23 mg/dL    Creatinine 1.40 (H) 0.50 - 1.30 mg/dL    eGFR 50 (L) >60 mL/min/1.73m*2    Calcium 9.3 8.6 - 10.3 mg/dL    Albumin 4.3 3.4 - 5.0 g/dL    Alkaline Phosphatase 111 33 - 136 U/L    Total Protein 7.2 6.4 - 8.2 g/dL    AST 17 9 - 39 U/L    Bilirubin, Total 0.7 0.0 - 1.2 mg/dL    ALT 16 10 - 52 U/L   Hemoglobin A1C    Collection Time: 09/24/24  9:23 AM   Result Value Ref Range    Hemoglobin A1C 6.1 (H) See comment %    Estimated Average Glucose 128 Not Established mg/dL   Lipid Panel    Collection Time: 09/24/24  9:23 AM   Result Value Ref Range    Cholesterol 103 0 - 199 mg/dL    HDL-Cholesterol 28.4 mg/dL    Cholesterol/HDL Ratio 3.6     LDL Calculated 18 <=99 mg/dL    VLDL 57 (H) 0 - 40 mg/dL    Triglycerides 284 (H) 0 - 149 mg/dL    Non HDL Cholesterol 75 0 - 149 mg/dL         Assessment/Plan     84 y.o. male with medical history significant for CKD3, HTN, DMII, gout, GERD, HLD, nephrolithiasis, prostate cancer s/p radiation therapy, and overactive bladder who presents for a 6-month fuv for CKD3.    # CKD3: baseline sCr 1.4 - 1.7 mg/dL with eGFR 41- 50 ml/min/1.73m2. 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 Most recent sCr 1.40 mg/dL with eGFR 50 " ml/min/1.73m2 (24) - at baseline. Currently stable.    #HTN: well controlled with current regimen.    # DMII: well controlled. Most recent HgbA1c 6.4% (24).    # Gout: no recurrent flare-ups. On anti-gout agent.    # Nephrolithiasis: no recurrence since . Continue potassium citrate. Regular f/u with Urology.    # Prostate cancer: s/p radiation therapy and overactive bladder. f/u with Urology.    Plan:  - Continue to follow with PCP for optimal DMII and HTN management.  - Continue to follow with urology.  - No changes with current medications.      - Advised regular home BP checks and keep a log to bring it to next visit. Target < 130/80 mmHg. To call providers if BPs are persistently > 130/80 mmHg.  - Reviewed strategies for preserving remaining kidney function includin) Avoidance of NSAIDs including Aleve (Naprosyn), Motrin (Ibuprofen), Mobic (Meloxicam), Celebrex (Celecoxib) and aspirin as well as PPI acid blocking medications such as Prilosec (omeprazole), Protonix (pantoprazole), and Nexium (esomeprazole), as well as other nephrotoxic agents.   2) Avoidance of tobacco or alcohol use.   3) Adequate hydration daily.   4) Blood pressure target 130/80 mmHg.   5) Daily dietary sodium intake less than 2 grams per day.    6) Maintain healthy lifestyle. Healthy diet and regular exercises.   7) Maintain ideal weight.  - FUV: in 6 months or sooner if concerns arise.     Patient verbalized understanding of above. He will not hesitate to contact Division of Nephrology at [phone] with concerns/questions.     I spent 20 minutes in the professional and overall care of this patient.      Cristal Egan, APRN-CNP

## 2024-11-20 ENCOUNTER — TELEPHONE (OUTPATIENT)
Dept: PRIMARY CARE | Facility: CLINIC | Age: 84
End: 2024-11-20
Payer: MEDICARE

## 2024-11-20 NOTE — TELEPHONE ENCOUNTER
PATIENT NEED PT REFERRAL FOR PT FOR HIS RIGHT LEG AND HIP YOU HAVE GIVEN IT TO HIM IN THE PAST.

## 2024-12-30 ENCOUNTER — TELEPHONE (OUTPATIENT)
Dept: PRIMARY CARE | Facility: CLINIC | Age: 84
End: 2024-12-30
Payer: MEDICARE

## 2024-12-30 NOTE — TELEPHONE ENCOUNTER
Patient call requesting a Physical therapy prescription for his low back pain and his R hip. He has an appointment with [facility] on 1/3 and will need this referral to be fax over in order to be evaluated. Clinic fax#: [phone].

## 2025-01-02 ENCOUNTER — TELEPHONE (OUTPATIENT)
Dept: PRIMARY CARE | Facility: CLINIC | Age: 85
End: 2025-01-02
Payer: MEDICARE

## 2025-01-02 NOTE — TELEPHONE ENCOUNTER
HE IS ASKING FOR A PT REFERRAL FOR HIS BACK BE SENT OVER TO [facility] FAX NO# is [phone].

## 2025-01-03 DIAGNOSIS — M54.9 CHRONIC BACK PAIN, UNSPECIFIED BACK LOCATION, UNSPECIFIED BACK PAIN LATERALITY: Primary | ICD-10-CM

## 2025-01-03 DIAGNOSIS — G89.29 CHRONIC BACK PAIN, UNSPECIFIED BACK LOCATION, UNSPECIFIED BACK PAIN LATERALITY: Primary | ICD-10-CM

## 2025-01-15 ENCOUNTER — TELEPHONE (OUTPATIENT)
Dept: PRIMARY CARE | Facility: CLINIC | Age: 85
End: 2025-01-15
Payer: MEDICARE

## 2025-01-15 NOTE — TELEPHONE ENCOUNTER
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

## 2025-01-22 ENCOUNTER — APPOINTMENT (OUTPATIENT)
Dept: GASTROENTEROLOGY | Facility: CLINIC | Age: 85
End: 2025-01-22
Payer: MEDICARE

## 2025-01-22 VITALS — WEIGHT: 166 LBS | BODY MASS INDEX: 26.06 KG/M2 | HEIGHT: 67 IN | HEART RATE: 72 BPM

## 2025-01-22 DIAGNOSIS — R19.4 CHANGE IN BOWEL HABIT: Primary | ICD-10-CM

## 2025-01-22 DIAGNOSIS — R19.5 LOOSE STOOLS: ICD-10-CM

## 2025-01-22 DIAGNOSIS — R15.2 FECAL URGENCY: ICD-10-CM

## 2025-01-22 PROCEDURE — 1159F MED LIST DOCD IN RCRD: CPT | Performed by: INTERNAL MEDICINE

## 2025-01-22 PROCEDURE — 99214 OFFICE O/P EST MOD 30 MIN: CPT | Performed by: INTERNAL MEDICINE

## 2025-01-22 PROCEDURE — 1123F ACP DISCUSS/DSCN MKR DOCD: CPT | Performed by: INTERNAL MEDICINE

## 2025-01-22 PROCEDURE — 1157F ADVNC CARE PLAN IN RCRD: CPT | Performed by: INTERNAL MEDICINE

## 2025-01-22 ASSESSMENT — ENCOUNTER SYMPTOMS: SHORTNESS OF BREATH: 0

## 2025-01-22 NOTE — PROGRESS NOTES
REASON FOR VISIT:  IBS  PCP (requesting provider): Sandra Daniel DO.    HPI:  Chilo Gibbs is a 84 y.o. 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 Colonoscopy showed single TA, diverticulosis, hemorrhoids, and radiation proctitis (1/2022). CT A/P w/out contrast showed cholelithiasis, umbilical hernia, diverticulosis, and small inguinal hernias (4/2024). He returns with diarrhea.    The patient reports an issue with loose bowels. He is currently doing PT for lumbar degenerative disc disease. Bowel habit issues are ongoing the last few years. He will often have a normal bowel movement in the morning. He is lactose intolerant. He notes that he had radiation after his prostate cancer surgery. He has fecal urgency that was occurring about once per week but this has gotten less frequent. He has three bowel movements per day and it is formed. Rarely it will be a loose stool in the morning. And then ok after noon. No blood in the stool. His radiation for prostate was 12 years ago. He tried taking Benefiber and only took it once daily for a handful of times. He has not tried Imodium. He is diabetic. No abdominal pain. No rectal pain.    PSurgHx:  -Prostatectomy   -Right inguinal hernia repair      FamHx: No GI cancer     Prior Endoscopy:  -Colonoscopy (1/2022): Excellent prep, normal TI, 4 mm ascending polyp (TA), severe sigmoid diverticulosis, small IH/EH, mild radiation proctitis in distal rectum, otherwise normal colon.  -Colonoscopy (4/2016): Normal TI, mild descending and sigmoid diverticulosis, abnormal vascularity in the distal rectum consistent with radiation proctitis, otherwise normal (no biopsy results available).  -Colonoscopy (9/2013): Moderate descending and sigmoid diverticulosis, abnormal vascularity in  the rectum consistent with radiation proctitis.     PAST MEDICAL HISTORY  Past Medical History:   Diagnosis Date    Noninfective gastroenteritis and colitis, unspecified     Acute colitis    Other conditions influencing health status     Cataract Fragments In Both Eyes After Cataract Surgery    Other conditions influencing health status     Prostate Cancer    Other conditions influencing health status     Nephrolithiasis    Personal history of other specified conditions     History of heartburn    Personal history of other specified conditions 12/10/2021    History of elevated prostate specific antigen (PSA)       PAST SURGICAL HISTORY  Past Surgical History:   Procedure Laterality Date    COLONOSCOPY  10/01/2013    Complete Colonoscopy    OTHER SURGICAL HISTORY  10/01/2013    Needle/Cath Placement For Brachyther Applic In Pelvic Organs    OTHER SURGICAL HISTORY  10/01/2013    Radiation Therapy    OTHER SURGICAL HISTORY  10/01/2013    Prostatectomy, Perineal Radical With Lymph Node Biopsy(S)    SHOULDER SURGERY  10/01/2013    Shoulder Surgery       FAMILY HISTORY  Family History   Problem Relation Name Age of Onset    Prostate cancer Other         SOCIAL HISTORY   reports that he has never smoked. He has never used smokeless tobacco. He reports that he does not currently use alcohol. He reports that he does not use drugs.    REVIEW OF SYSTEMS  Review of Systems   Respiratory:  Negative for shortness of breath.    Cardiovascular:  Negative for chest pain.   All other systems reviewed and are negative.    A 10+ point review of systems was otherwise negative except as noted and per HPI.    ALLERGIES  No Known Allergies    MEDICATIONS  Current Outpatient Medications   Medication Instructions    acitretin (Soriatane) 17.5 mg capsule 1 Capsule    allopurinol (ZYLOPRIM) 100 mg, oral, Daily    amLODIPine (NORVASC) 5 mg, oral, Daily    calcipotriene (Dovonex) 0.005 % cream Use as needed to affected area    clobetasol  (Temovate) 0.05 % external solution Topical, Daily    famotidine (PEPCID) 20 mg, oral, Daily    gabapentin (Neurontin) 300 mg capsule Take 1 q am, and 1 in the afternoon and 2 each evening, total of 4 per day    ketoconazole (NIZOral) 2 % shampoo Use 2-3x a week. Leave on for 5 min before rinsing.    lancets (OneTouch Delica Plus Lancet) 30 gauge misc Test bs once daily    OneTouch Verio Flex meter misc TO TEST BLOOD SUGAR    OneTouch Verio test strips strip TEST BLOOD SUGAR ONCE A DAY    oxybutynin XL (DITROPAN-XL) 10 mg, oral, Daily    potassium citrate CR (Urocit-K-10) 10 mEq ER tablet 10 mEq, oral, 2 times daily    rosuvastatin (CRESTOR) 20 mg, oral, Daily       VITALS  Vitals:    01/22/25 1502   Pulse: 72      Body mass index is 26 kg/m².    PHYSICAL EXAM  CONSTITUTIONAL: NAD, appears stated age  EYES: anicteric sclera, sclera clear  HEAD: normocephalic, atraumatic   NECK: supple   PULMONARY: CTAB  CARDIOVASCULAR: RRR, no M/R/G appreciated   ABDOMEN: soft, NTND, +BS, no rebound or guarding   MUSCULOSKELETAL: no edema  SKIN: no jaundice   PSYCHIATRIC: AOx3, appropriate insight and judgement    LABS  WBC   Date Value   04/15/2024 5.6 x10*3/uL   04/17/2023 8.5 x10E9/L   01/30/2023 7.2 x10E9/L   06/24/2022 5.8 x10E9/L     Hemoglobin (g/dL)   Date Value   04/15/2024 15.2   04/17/2023 14.3   01/30/2023 14.5   06/24/2022 14.9     Platelets   Date Value   04/15/2024 142 x10*3/uL (L)   04/17/2023 256 x10E9/L   01/30/2023 266 x10E9/L   06/24/2022 270 x10E9/L     Sodium (mmol/L)   Date Value   09/24/2024 139   07/02/2024 139   04/15/2024 136     Potassium (mmol/L)   Date Value   09/24/2024 4.0   07/02/2024 4.4   04/15/2024 3.7     Chloride (mmol/L)   Date Value   09/24/2024 106   07/02/2024 104   04/15/2024 104     Bicarbonate (mmol/L)   Date Value   09/24/2024 25   07/02/2024 26   04/15/2024 23     Urea Nitrogen (mg/dL)   Date Value   09/24/2024 28 (H)   07/02/2024 29 (H)   04/15/2024 34 (H)     Creatinine (mg/dL)   Date  "Value   09/24/2024 1.40 (H)   07/02/2024 1.45 (H)   04/15/2024 1.50 (H)     Calcium (mg/dL)   Date Value   09/24/2024 9.3   07/02/2024 9.7   04/15/2024 8.3 (L)     Total Protein (g/dL)   Date Value   09/24/2024 7.2   07/02/2024 7.3   04/02/2024 6.9     Bilirubin, Total (mg/dL)   Date Value   09/24/2024 0.7   07/02/2024 0.9   04/02/2024 0.9     Alkaline Phosphatase (U/L)   Date Value   09/24/2024 111   07/02/2024 113   04/02/2024 103     ALT (U/L)   Date Value   09/24/2024 16   07/02/2024 19   04/02/2024 17     AST (U/L)   Date Value   09/24/2024 17   07/02/2024 20   04/02/2024 16     Glucose (mg/dL)   Date Value   09/24/2024 106 (H)   07/02/2024 106 (H)   04/15/2024 116 (H)     No results found for: \"LIPASE\", \"CRP\"    ASSESSMENT/PLAN  Chilo Gibbs is a 84 y.o. 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 Colonoscopy showed single TA, diverticulosis, hemorrhoids, and radiation proctitis (1/2022). CT A/P w/out contrast showed cholelithiasis, umbilical hernia, diverticulosis, and small inguinal hernias (4/2024). He returns with change in bowel habits with intermittent loose stools. Symptoms likely multifactorial related to mild radiation proctitis, possible anal sphincter laxity in setting of lower back degenerative disc disease, and IBS. He will work on being diligent with a fiber supplement to bulk the stool.    -Use Benefiber once daily and uptitrate to TID PRN   -Advised high fiber diet, regular exercise, and good hydration  -He will also drink coffee in moderation as this is a stimulant of his gastrocolic reflex for him    Follow-up in the office PRN.    Signature: Jm Wolfe MD  "

## 2025-01-22 NOTE — PATIENT INSTRUCTIONS
Use Benefiber powder 2 teaspoonfuls (1 serving) mixed with 8 ounces of juice or water once daily. Increase to twice daily after 1-2 weeks if needed. Can go up to three times per day after 1 month if needed. Stay hydrated, get regular exercise, and eat a high fiber diet. Follow-up in the office PRN.

## 2025-01-24 ENCOUNTER — APPOINTMENT (OUTPATIENT)
Dept: PRIMARY CARE | Facility: CLINIC | Age: 85
End: 2025-01-24
Payer: MEDICARE

## 2025-01-24 ENCOUNTER — OFFICE VISIT (OUTPATIENT)
Dept: PRIMARY CARE | Facility: CLINIC | Age: 85
End: 2025-01-24
Payer: MEDICARE

## 2025-01-24 VITALS
HEIGHT: 67 IN | OXYGEN SATURATION: 97 % | WEIGHT: 170 LBS | BODY MASS INDEX: 26.68 KG/M2 | SYSTOLIC BLOOD PRESSURE: 132 MMHG | DIASTOLIC BLOOD PRESSURE: 69 MMHG | RESPIRATION RATE: 14 BRPM | HEART RATE: 76 BPM

## 2025-01-24 DIAGNOSIS — K58.9 IRRITABLE BOWEL SYNDROME, UNSPECIFIED TYPE: ICD-10-CM

## 2025-01-24 DIAGNOSIS — M54.9 BACK PAIN, UNSPECIFIED BACK LOCATION, UNSPECIFIED BACK PAIN LATERALITY, UNSPECIFIED CHRONICITY: ICD-10-CM

## 2025-01-24 DIAGNOSIS — Z87.442 HISTORY OF KIDNEY STONES: Primary | ICD-10-CM

## 2025-01-24 DIAGNOSIS — G63 POLYNEUROPATHY ASSOCIATED WITH UNDERLYING DISEASE (MULTI): ICD-10-CM

## 2025-01-24 PROCEDURE — 1157F ADVNC CARE PLAN IN RCRD: CPT | Performed by: FAMILY MEDICINE

## 2025-01-24 PROCEDURE — G2211 COMPLEX E/M VISIT ADD ON: HCPCS | Performed by: FAMILY MEDICINE

## 2025-01-24 PROCEDURE — 3078F DIAST BP <80 MM HG: CPT | Performed by: FAMILY MEDICINE

## 2025-01-24 PROCEDURE — 1159F MED LIST DOCD IN RCRD: CPT | Performed by: FAMILY MEDICINE

## 2025-01-24 PROCEDURE — 3075F SYST BP GE 130 - 139MM HG: CPT | Performed by: FAMILY MEDICINE

## 2025-01-24 PROCEDURE — 99214 OFFICE O/P EST MOD 30 MIN: CPT | Performed by: FAMILY MEDICINE

## 2025-01-24 PROCEDURE — 1123F ACP DISCUSS/DSCN MKR DOCD: CPT | Performed by: FAMILY MEDICINE

## 2025-01-24 PROCEDURE — 1160F RVW MEDS BY RX/DR IN RCRD: CPT | Performed by: FAMILY MEDICINE

## 2025-01-24 NOTE — PROGRESS NOTES
"Subjective   Patient ID: Chilo Gibbs is a 84 y.o. male who presents for Med Refill and Follow-up ([facility] REFERRAL ).    HPI   PCP: Dr. Flowers    H/O Kidney stones, takes Potassium citrate.  Not completely out of meds yet, but states insurance is no longer covering it.  Not sure what alternative med(s) are covered.    Has Polyneuropathy, Tx w/Gabapentin.  Supposed to be taking 1 tab am, 1 tab afternoon, 2 tabs evening.  States he has been weaning himself off, currently only taking 1 tab at night.  Does not need refills at this time.    Has Back pain.  Currently in PT for back pain, states he needs new order.  PCP referred patient to ortho.  No ortho or PT records in the chart.  Initially stated he saw ortho and ortho referred him to PT, then later said he didn't see ortho and went straight to PT.    Has IBS, reports problems controlling his bowels either after eating or during eating.  Saw GI 2 days ago (see progress note).  States he has not had any problems since starting Benefiber as recommended.    Review of Systems  No other complaints.     Objective   /69   Pulse 76   Resp 14   Ht 1.702 m (5' 7\")   Wt 77.1 kg (170 lb)   SpO2 97%   BMI 26.63 kg/m²     Physical Exam  Constitutional:       General: He is not in acute distress.     Appearance: He is overweight.   Abdominal:      General: Bowel sounds are normal. There is no distension.      Palpations: Abdomen is soft. There is no mass.      Tenderness: There is no abdominal tenderness. There is no guarding or rebound.   Neurological:      Mental Status: He is oriented to person, place, and time.   Psychiatric:         Mood and Affect: Mood normal.         Behavior: Behavior normal.     Assessment/Plan   Diagnoses and all orders for this visit:  History of kidney stones  Polyneuropathy associated with underlying disease (Multi)  Back pain, unspecified back location, unspecified back pain laterality, unspecified chronicity  Irritable bowel syndrome, " unspecified type    Patient to contact pharmacy or insurance to find out what alternate medicine is covered in place of the Potassium citrate.  Requested records from ortho and PT (release form signed).  Does not need Gabapentin refill (has decreased the dose on his own).  Follow up with GI if symptoms persists despite their recommendations.    F/U w/PCP.

## 2025-01-24 NOTE — PATIENT INSTRUCTIONS
Patient to contact pharmacy or insurance to find out what alternate medicine is covered in place of the Potassium citrate.  Requested records from ortho and PT (release form signed).  Does not need Gabapentin refill (has decreased the dose on his own).  Follow up with GI if symptoms persists despite their recommendations.    F/U w/PCP.

## 2025-02-04 ENCOUNTER — OFFICE VISIT (OUTPATIENT)
Dept: PAIN MEDICINE | Facility: HOSPITAL | Age: 85
End: 2025-02-04
Payer: MEDICARE

## 2025-02-04 DIAGNOSIS — M54.16 LUMBAR RADICULOPATHY: ICD-10-CM

## 2025-02-04 PROCEDURE — 1159F MED LIST DOCD IN RCRD: CPT | Performed by: ANESTHESIOLOGY

## 2025-02-04 PROCEDURE — 1157F ADVNC CARE PLAN IN RCRD: CPT | Performed by: ANESTHESIOLOGY

## 2025-02-04 PROCEDURE — 99214 OFFICE O/P EST MOD 30 MIN: CPT | Performed by: ANESTHESIOLOGY

## 2025-02-04 PROCEDURE — 1123F ACP DISCUSS/DSCN MKR DOCD: CPT | Performed by: ANESTHESIOLOGY

## 2025-02-04 PROCEDURE — 1125F AMNT PAIN NOTED PAIN PRSNT: CPT | Performed by: ANESTHESIOLOGY

## 2025-02-04 ASSESSMENT — PAIN SCALES - GENERAL: PAINLEVEL_OUTOF10: 7

## 2025-02-04 NOTE — PROGRESS NOTES
Chief Complaint   Patient presents with    Back Pain    Extremity Pain     Subjective   Patient ID: Chilo Gibbs is a 84 y.o. male with a past medical history of chronic kidney disease stage III, type 2 diabetes, chronic low back pain, who presents today for follow-up for his low back pain.  Patient states that since he saw us last on 8/16/2022, he had been doing well with physical therapy exercises and oral medications including acetaminophen, Aleve, and gabapentin.  He notes over the last few months, his pain has become less responsive to his medications.  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 He states that when he has been sitting for prolonged period of time and then goes to get up, he initially feels stiff, but that goes away shortly after.  When he continues his activity in the standing position, his pain begins to intensify.  Patient states that his pain starts in the lower back, and travels posterior-laterally to the thigh and calf bilaterally, but worse on the right.  Pain is worse with standing and walking, and better when he sits. The pain causes significant stress in the patient's life, interfering with general activity, mood, ability to walk distances, ability to perform tasks at home and/or work. Patient participates in physical therapy, and continues to perform physician directed exercises at home. Patient denies any bowel or bladder incontinence, saddle anesthesia, weaknesses, or falls.    Review of Systems   13-point ROS done and negative except for HPI.     Current Outpatient Medications   Medication Instructions    acitretin (Soriatane) 17.5 mg capsule 1 Capsule    allopurinol (ZYLOPRIM) 100 mg, oral, Daily    amLODIPine (NORVASC) 5 mg, oral, Daily    calcipotriene (Dovonex) 0.005 % cream Use as needed to affected area    clobetasol (Temovate) 0.05 % external solution Topical, Daily    famotidine (PEPCID) 20 mg, oral,  Daily    gabapentin (Neurontin) 300 mg capsule Take 1 q am, and 1 in the afternoon and 2 each evening, total of 4 per day    ketoconazole (NIZOral) 2 % shampoo Use 2-3x a week. Leave on for 5 min before rinsing.    lancets (OneTouch Delica Plus Lancet) 30 gauge misc Test bs once daily    OneTouch Verio Flex meter misc TO TEST BLOOD SUGAR    OneTouch Verio test strips strip TEST BLOOD SUGAR ONCE A DAY    oxybutynin XL (DITROPAN-XL) 10 mg, oral, Daily    potassium citrate CR (Urocit-K-10) 10 mEq ER tablet 10 mEq, oral, 2 times daily    rosuvastatin (CRESTOR) 20 mg, oral, Daily       Past Medical History:   Diagnosis Date    Noninfective gastroenteritis and colitis, unspecified     Acute colitis    Other conditions influencing health status     Cataract Fragments In Both Eyes After Cataract Surgery    Other conditions influencing health status     Prostate Cancer    Other conditions influencing health status     Nephrolithiasis    Personal history of other specified conditions     History of heartburn    Personal history of other specified conditions 12/10/2021    History of elevated prostate specific antigen (PSA)        Past Surgical History:   Procedure Laterality Date    COLONOSCOPY  10/01/2013    Complete Colonoscopy    OTHER SURGICAL HISTORY  10/01/2013    Needle/Cath Placement For Brachyther Applic In Pelvic Organs    OTHER SURGICAL HISTORY  10/01/2013    Radiation Therapy    OTHER SURGICAL HISTORY  10/01/2013    Prostatectomy, Perineal Radical With Lymph Node Biopsy(S)    SHOULDER SURGERY  10/01/2013    Shoulder Surgery        Family History   Problem Relation Name Age of Onset    Prostate cancer Other          No Known Allergies     Objective     There were no vitals filed for this visit.     Physical Exam  General: NAD, well groomed, well nourished  Eyes: Non-icteric sclera, EOMI  Ears, Nose, Mouth, and Throat: External ears and nose appear to be without deformity or rash. No lesions or masses noted. Hearing  is grossly intact.   Neck: Supple, trachea midline, no appreciable lumps or lymph nodes  Respiratory: Nonlabored breathing   Cardiovascular: No peripheral edema observed  Skin: No rashes or open lesions/ulcers identified on skin.  Psychiatric: Alert, orientation to person, place, and time. Cooperative.    Neurologic:   Cranial nerves grossly intact.   Strength: 5/5 and symmetric plantar/dorsiflexion   Sensation: Normal to light touch and pinprick intact throughout; except mildly diminished in the posterior right lower extremity  DTRs:normal and symmetric throughout    Back:   Palpation: No tenderness to palpation over lumbar paraspinous muscles.   Straight leg raise: positive at 60 degrees on the right   ANTONIA Maneuver does not reproduce pain bilaterally  Facet Loading test: absent on lumbar spine    Hip: No pain over greater trochanters. and Pain not reproduced with hip internal/external rotation.     Imaging personally reviewed and independently interpreted:   The patient did have a prior CT abdomen and pelvis which was done in April 2024 which shows degenerative changes in the lower lumbar spine.  There is some listhesis at L4-5 with disc bulging and degenerative changes including arthritis there.  There is also degenerative changes at L3-4 and L5-S1.    Assessment/Plan   Chilo Gibbs is a 84 y.o. male with a past medical history of chronic kidney disease stage III, type 2 diabetes, chronic low back pain, who presents today for follow-up for his low back pain. Patient's low back pain radiates to the bilateral lower extremities, but is worse on the right.  It is also worse with prolonged standing and walking, and endorses positive shopping cart sign.  Positive straight leg raise on the right.  On chart review, note on 8/16/2022 reported MRI showing disc bulge with central canal stenosis and neuroforaminal narrowing at the L4-L5 level.  Patient has attempted oral medications including acetaminophen, NSAIDs  (sparingly in the setting of CKD 3), and gabapentin with now diminishing pain relief.  He continues to participate in physician directed home physical therapy exercises.  He would like to proceed seed with an epidural steroid injection.    Plan:  - We will schedule patient for lumbar interlaminar epidural steroid injection at the L4-5 level with a right-sided bias.  Patient denies taking any blood thinners at this time.  The patient has already failed conservative therapies including medications such as acetaminophen, NSAIDs, and gabapentinoids; as well as physical therapy. Therefore, we discussed extensively the risks, benefits, and alternatives to the procedure. The patient's questions were addressed and answered in detail. The patient demonstrated understanding of the procedure, and is amenable to proceeding with it. The Risks of the procedure that were discussed with the patient include but are not limited to the following: A lack of efficacy, transient worsening of pain, bleeding, infection, nerve injury, nerve damage, neuritis or sunburn sensation, worsening control of blood glucose from steroid medication, epidural hematoma, posterior puncture headache, and paralysis.  We also discussed the increased risk of infection and wound dehiscence associated with smoking, obesity, and diabetes.     Follow up: After procedure    The patient was invited to contact us back anytime with any questions or concerns and follow-up with us in the office as needed.     There are no diagnoses linked to this encounter.    This note was generated with the aid of dictation software, there may be typos despite my attempts at proofreading.     Stephenie Robles MD  Interventional Pain Medicine Fellow  [facility], [place]

## 2025-02-19 ENCOUNTER — HOSPITAL ENCOUNTER (OUTPATIENT)
Facility: HOSPITAL | Age: 85
Discharge: HOME | End: 2025-02-19
Payer: MEDICARE

## 2025-02-19 VITALS
OXYGEN SATURATION: 97 % | DIASTOLIC BLOOD PRESSURE: 72 MMHG | RESPIRATION RATE: 16 BRPM | BODY MASS INDEX: 26.37 KG/M2 | WEIGHT: 168 LBS | TEMPERATURE: 98.1 F | SYSTOLIC BLOOD PRESSURE: 142 MMHG | HEIGHT: 67 IN | HEART RATE: 69 BPM

## 2025-02-19 DIAGNOSIS — M54.16 LUMBAR RADICULOPATHY: ICD-10-CM

## 2025-02-19 PROCEDURE — 2550000001 HC RX 255 CONTRASTS: Performed by: ANESTHESIOLOGY

## 2025-02-19 PROCEDURE — 62323 NJX INTERLAMINAR LMBR/SAC: CPT | Performed by: ANESTHESIOLOGY

## 2025-02-19 PROCEDURE — 2500000004 HC RX 250 GENERAL PHARMACY W/ HCPCS (ALT 636 FOR OP/ED): Performed by: ANESTHESIOLOGY

## 2025-02-19 RX ORDER — METHYLPREDNISOLONE ACETATE 40 MG/ML
INJECTION, SUSPENSION INTRA-ARTICULAR; INTRALESIONAL; INTRAMUSCULAR; SOFT TISSUE
Status: COMPLETED | OUTPATIENT
Start: 2025-02-19 | End: 2025-02-19

## 2025-02-19 RX ORDER — ROPIVACAINE HYDROCHLORIDE 5 MG/ML
INJECTION, SOLUTION EPIDURAL; INFILTRATION; PERINEURAL
Status: COMPLETED | OUTPATIENT
Start: 2025-02-19 | End: 2025-02-19

## 2025-02-19 RX ADMIN — IOHEXOL 1 ML: 240 INJECTION, SOLUTION INTRATHECAL; INTRAVASCULAR; INTRAVENOUS; ORAL at 15:05

## 2025-02-19 RX ADMIN — METHYLPREDNISOLONE ACETATE 40 MG: 40 INJECTION, SUSPENSION INTRA-ARTICULAR; INTRALESIONAL; INTRAMUSCULAR; SOFT TISSUE at 15:06

## 2025-02-19 RX ADMIN — ROPIVACAINE HYDROCHLORIDE 8 ML: 5 INJECTION, SOLUTION EPIDURAL; INFILTRATION; PERINEURAL at 15:05

## 2025-02-19 ASSESSMENT — PAIN SCALES - GENERAL
PAINLEVEL_OUTOF10: 1
PAINLEVEL_OUTOF10: 2
PAINLEVEL_OUTOF10: 0 - NO PAIN

## 2025-02-19 ASSESSMENT — PAIN - FUNCTIONAL ASSESSMENT
PAIN_FUNCTIONAL_ASSESSMENT: CPOT (CRITICAL CARE PAIN OBSERVATION TOOL)
PAIN_FUNCTIONAL_ASSESSMENT: 0-10
PAIN_FUNCTIONAL_ASSESSMENT: 0-10

## 2025-02-19 NOTE — DISCHARGE INSTRUCTIONS
DISCHARGE INSTRUCTIONS FOR INJECTIONS     You underwent  lumbar interlaminar epidural steroid injection at the L4-5 level today    After most injections, it is recommended that you relax and limit your activity for the remainder of the day unless you have been told otherwise by your pain physician.  You should not drive a car, operate machinery, or make important legal decisions unless otherwise directed by your pain physician.  You may resume your normal activity, including exercise, tomorrow.      Keep a written pain diary of how much pain relief you experienced following the injection procedure and the length of time of pain relief you experienced pain relief. Following diagnostic injections like medial branch nerve blocks, sacroiliac joint blocks, stellate ganglion injections and other blocks, it is very important you record the specific amount of pain relief you experienced immediately after the injectionand how long it lasted. Your doctor will ask you for this information at your follow up visit.     For all injections, please keep the injection site dry and inspect the site for a couple of days. You may remove the Band-Aid the day of the injection at any time.     Some discomfort, bruising or slight swelling may occur at the injection site. This is not abnormal if it occurs.  If needed you may:    -Take over the counter medication such as Tylenol or Motrin.   -Apply an ice pack for 30 minutes, 2 to 3 times a day for the first 24 hours.     You may shower today; no soaking baths, hot tubs, whirlpools or swimming pools for two days.      If you are given steroids in your injection, it may take 3-5 days for the steroid medication to take effect. You may notice a worsening of your symptoms for 1-2 days after the injection. This is not abnormal.  You may use acetaminophen, ibuprofen, or prescription medication that your doctor may have prescribed for you if you need to do so.     A few common side effects of  steroids include facial flushing, sweating, restlessness, irritability,difficulty sleeping, increase in blood sugar, and increased blood pressure. If you have diabetes, please monitor your blood sugar at least once a day for at least 5 days. If you have poorly controlled high blood pressure, monitoryour blood pressure for at least 2 days and contact your primary care physician if these numbers are unusually high for you.      If you take aspirin or non-steroidal anti-inflammatory drugs (examples are Motrin, Advil, ibuprofen, Naprosyn, Voltaren, Relafen, etc.) you may restart these this evening, but stop taking it 3 days before your next appointment, unless instructed otherwiseby your physician.      You do not need to discontinue non-aspirin-containing pain medications prior to an injection (examples: Celebrex, tramadol, hydrocodone and acetaminophen).      If you take a blood thinning medication (Coumadin, Lovenox, Fragmin,Ticlid, Plavix, Pradaxa, etc.), please discuss this with your primary care physician/cardiologist and your pain physician. These medications MUST be discontinued before you can have an injection safely, without the risk of uncontrolled bleeding. If these medications are not discontinued for an appropriate period of time, you will not be able to receivean injection. Please adhere to instructions given to you about when to restart your blood thinning medication. If you have any questions please reach out to our team.    If you are taking Coumadin, please have your INR checked the morning of your procedure and bring the result to your appointment unless otherwise instructed. If your INR is over 1.2, your injection may need to be rescheduled to avoid uncontrolled bleeding from the needle placement.     Call [facility]  and ask for Pain Management at [phone] between 8am-4pm Monday - Friday if you are experiencing the following:    If you received an epidural or spinal injection:    -Headache that  doesnot go away with medicine, is worse when sitting or standing up, and is greatly relieved upon lying down.   -Severe pain worse than or different than your baseline pain.   -Chills or fever (101º F or greater).   -Drainage or signs of infection at the injection site     Go directly to the Emergency Department if you are experiencing the following and received an epidural or spinal injection:   -Abrupt weakness or progressive weakness in your legs that starts after you leave the clinic.   -Abrupt severe or worsening numbness in your legs.   -Inability to urinate after the injection or loss of bowel or bladder control without the urge to defecate or urinate.     If you have a clinical question that cannot wait until your next appointment, please call [phone] between 8am-4pm Monday - Friday or send a [employer] message. We do our best to return all non-emergency messages within 24 hours, Monday - Friday. A nurse or physician will return your message. You may also the appropriate nurse below, and they will do their best to answer your questions.  - For Dr. Silva, call [facility] at [phone]  - For Dr. Rizzo, call [facility] at [phone]  - For Dr. Salas, call [facility] at [phone]  - For Dr. Mahoney, call [facility] at [phone]  - For Dr. Polanco, call [facility] at [phone]    If you need to cancel an appointment, please call the scheduling staff at [phone] during normal business hours or leave a message at least 24 hours in advance.     If you are going to be sedated for your next procedure, you MUST have responsible adult who can legally drive accompany you home. You cannot eat or drink for at least eight hours prior to the planned procedure if you are going to receive sedation. You may take your non-blood thinning medications with a small sip of water.

## 2025-02-19 NOTE — H&P
HISTORY AND PHYSICAL    History Of Present Illness  Chilo Gibbs is a 84 y.o. male presenting with chronic pain here for procedure as stated below. Patient denies any changes to health since the last visit to our clinic.    Past Medical History  Past Medical History:   Diagnosis Date    Noninfective gastroenteritis and colitis, unspecified     Acute colitis    Other conditions influencing health status     Cataract Fragments In Both Eyes After Cataract Surgery    Other conditions influencing health status     Prostate Cancer    Other conditions influencing health status     Nephrolithiasis    Personal history of other specified conditions     History of heartburn    Personal history of other specified conditions 12/10/2021    History of elevated prostate specific antigen (PSA)       Surgical History  Past Surgical History:   Procedure Laterality Date    COLONOSCOPY  10/01/2013    Complete Colonoscopy    OTHER SURGICAL HISTORY  10/01/2013    Needle/Cath Placement For Brachyther Applic In Pelvic Organs    OTHER SURGICAL HISTORY  10/01/2013    Radiation Therapy    OTHER SURGICAL HISTORY  10/01/2013    Prostatectomy, Perineal Radical With Lymph Node Biopsy(S)    SHOULDER SURGERY  10/01/2013    Shoulder Surgery        Social History  He reports that he has never smoked. He has never used smokeless tobacco. He reports that he does not currently use alcohol. He reports that he does not use drugs.    Family History  Family History   Problem Relation Name Age of Onset    Prostate cancer Other          Allergies  Patient has no known allergies.    Review of Systems  12 point ROS done and negative except for the above.     Physical Exam  General: NAD, well groomed, well nourished  Eyes: Non-icteric sclera, EOMI  Ears, Nose, Mouth, and Throat: External ears and nose appear to be without deformity or rash. No lesions or masses noted. Hearing is grossly intact.   Neck: Trachea midline  Respiratory: Nonlabored breathing    Cardiovascular: No peripheral edema   Skin: No rashes or open lesions/ulcers identified on skin.      Last Recorded Vitals  There were no vitals taken for this visit.    Relevant Results  Current Outpatient Medications   Medication Instructions    acitretin (Soriatane) 17.5 mg capsule 1 Capsule    allopurinol (ZYLOPRIM) 100 mg, oral, Daily    amLODIPine (NORVASC) 5 mg, oral, Daily    calcipotriene (Dovonex) 0.005 % cream Use as needed to affected area    clobetasol (Temovate) 0.05 % external solution Topical, Daily    famotidine (PEPCID) 20 mg, oral, Daily    gabapentin (Neurontin) 300 mg capsule Take 1 q am, and 1 in the afternoon and 2 each evening, total of 4 per day    ketoconazole (NIZOral) 2 % shampoo Use 2-3x a week. Leave on for 5 min before rinsing.    lancets (OneTouch Delica Plus Lancet) 30 gauge misc Test bs once daily    OneTouch Verio Flex meter misc TO TEST BLOOD SUGAR    OneTouch Verio test strips strip TEST BLOOD SUGAR ONCE A DAY    oxybutynin XL (DITROPAN-XL) 10 mg, oral, Daily    potassium citrate CR (Urocit-K-10) 10 mEq ER tablet 10 mEq, oral, 2 times daily    rosuvastatin (CRESTOR) 20 mg, oral, Daily       No results found for this or any previous visit from the past 1000 days.     No image results found.     No diagnosis found.        Assessment/Plan   Chilo Gibbs is a 84 y.o. male presenting with chronic pain here for lumbar interlaminar epidural steroid injection at the L4-5 level with a right-sided bias; he denies any recent antibiotic use or infections, he denies any blood thinner use , and he denies contrast or local anesthetic allergies.    ASA PS Classification: 3  Mallampati score: 2    Plan:  - We will proceed with the procedure as above. We discussed extensively the risks, benefits, and alternatives to the procedure. The patient's questions were addressed and answered in detail. The patient demonstrated understanding of the procedure, and is amenable to proceeding with it. The  Risks of the procedure that were discussed with the patient include but are not limited to the following: A lack of efficacy, transient worsening of pain, bleeding, infection, nerve injury, nerve damage, neuritis or sunburn sensation. Informed consent obtained as attached to EMR.  - Patient to continue physician directed home exercises as tolerated.  - Patient will follow-up with us in clinic.      Stephenie Robles MD  Chronic Pain Fellow  [facility]

## 2025-03-04 ENCOUNTER — TELEPHONE (OUTPATIENT)
Dept: PRIMARY CARE | Facility: CLINIC | Age: 85
End: 2025-03-04
Payer: MEDICARE

## 2025-03-10 DIAGNOSIS — M54.16 LUMBAR RADICULOPATHY: ICD-10-CM

## 2025-03-26 ENCOUNTER — HOSPITAL ENCOUNTER (OUTPATIENT)
Dept: RADIOLOGY | Facility: CLINIC | Age: 85
Discharge: HOME | End: 2025-03-26
Payer: MEDICARE

## 2025-03-26 DIAGNOSIS — M54.16 LUMBAR RADICULOPATHY: ICD-10-CM

## 2025-03-26 PROCEDURE — 72148 MRI LUMBAR SPINE W/O DYE: CPT

## 2025-03-26 PROCEDURE — 72148 MRI LUMBAR SPINE W/O DYE: CPT | Performed by: RADIOLOGY

## 2025-04-17 ENCOUNTER — APPOINTMENT (OUTPATIENT)
Dept: PRIMARY CARE | Facility: CLINIC | Age: 85
End: 2025-04-17
Payer: MEDICARE

## 2025-04-25 ENCOUNTER — APPOINTMENT (OUTPATIENT)
Facility: CLINIC | Age: 85
End: 2025-04-25
Payer: MEDICARE

## 2025-04-25 VITALS
HEART RATE: 72 BPM | WEIGHT: 171.2 LBS | SYSTOLIC BLOOD PRESSURE: 136 MMHG | BODY MASS INDEX: 26.87 KG/M2 | DIASTOLIC BLOOD PRESSURE: 66 MMHG | OXYGEN SATURATION: 95 % | HEIGHT: 67 IN | RESPIRATION RATE: 18 BRPM

## 2025-04-25 DIAGNOSIS — N20.0 NEPHROLITHIASIS: ICD-10-CM

## 2025-04-25 DIAGNOSIS — E11.65 TYPE 2 DIABETES MELLITUS WITH HYPERGLYCEMIA, WITHOUT LONG-TERM CURRENT USE OF INSULIN: ICD-10-CM

## 2025-04-25 DIAGNOSIS — I10 ESSENTIAL HYPERTENSION: ICD-10-CM

## 2025-04-25 DIAGNOSIS — C61 MALIGNANT NEOPLASM OF PROSTATE (MULTI): ICD-10-CM

## 2025-04-25 DIAGNOSIS — M10.00 IDIOPATHIC GOUT, UNSPECIFIED CHRONICITY, UNSPECIFIED SITE: ICD-10-CM

## 2025-04-25 DIAGNOSIS — N18.30 STAGE 3 CHRONIC KIDNEY DISEASE, UNSPECIFIED WHETHER STAGE 3A OR 3B CKD (MULTI): Primary | ICD-10-CM

## 2025-04-25 PROCEDURE — 1126F AMNT PAIN NOTED NONE PRSNT: CPT | Performed by: NURSE PRACTITIONER

## 2025-04-25 PROCEDURE — 1157F ADVNC CARE PLAN IN RCRD: CPT | Performed by: NURSE PRACTITIONER

## 2025-04-25 PROCEDURE — 3078F DIAST BP <80 MM HG: CPT | Performed by: NURSE PRACTITIONER

## 2025-04-25 PROCEDURE — 1123F ACP DISCUSS/DSCN MKR DOCD: CPT | Performed by: NURSE PRACTITIONER

## 2025-04-25 PROCEDURE — 3075F SYST BP GE 130 - 139MM HG: CPT | Performed by: NURSE PRACTITIONER

## 2025-04-25 PROCEDURE — 99213 OFFICE O/P EST LOW 20 MIN: CPT | Performed by: NURSE PRACTITIONER

## 2025-04-25 RX ORDER — POTASSIUM CITRATE 1080 MG/1
1 TABLET, EXTENDED RELEASE ORAL
COMMUNITY
Start: 2025-02-05

## 2025-04-25 RX ORDER — BLOOD-GLUCOSE CONTROL, NORMAL
EACH MISCELLANEOUS
Qty: 100 EACH | Refills: 3 | Status: SHIPPED | OUTPATIENT
Start: 2025-04-25

## 2025-04-25 ASSESSMENT — ENCOUNTER SYMPTOMS
CONSTITUTIONAL NEGATIVE: 1
HEMATOLOGIC/LYMPHATIC NEGATIVE: 1
EYES NEGATIVE: 1
GASTROINTESTINAL NEGATIVE: 1
RESPIRATORY NEGATIVE: 1
ENDOCRINE NEGATIVE: 1
CARDIOVASCULAR NEGATIVE: 1
PSYCHIATRIC NEGATIVE: 1
NEUROLOGICAL NEGATIVE: 1
MUSCULOSKELETAL NEGATIVE: 1

## 2025-04-25 ASSESSMENT — PAIN SCALES - GENERAL: PAINLEVEL_OUTOF10: 0-NO PAIN

## 2025-04-25 NOTE — TELEPHONE ENCOUNTER
Medication Name: Lancets (one touch delica plus lancet) 30 gauge misc     Frequency: Test bs once daily     [facility]: [facility]     Last appointment: 1/24/25  Last CPE:  Last MCW:  Next appointment: 6/18/25  Next CPE:  Next MCW:

## 2025-04-25 NOTE — PROGRESS NOTES
History Of Present Illness  Chilo Gibbs is a 85 y.o. male with medical history significant for CKD3, HTN, DMII, gout, GERD, HLD, nephrolithiasis, prostate cancer s/p radiation therapy, and overactive bladder who presents for a 6-month fuv for CKD3.     Past Medical History  As above.    Surgical History  He has a past surgical history that includes Other surgical history (10/01/2013); Shoulder surgery (10/01/2013); Other surgical history (10/01/2013); Colonoscopy (10/01/2013); and Other surgical history (10/01/2013).     Social History  He reports that he has never smoked. He has never used smokeless tobacco. He reports that he does not currently use alcohol. He reports that he does not use drugs.    Family History  Family History   Problem Relation Name Age of Onset    Prostate cancer Other          Allergies  Patient has no known allergies.    Review of Systems   Constitutional: Negative.    HENT: Negative.     Eyes: Negative.    Respiratory: Negative.     Cardiovascular: Negative.    Gastrointestinal: Negative.    Endocrine: Negative.    Genitourinary: Negative.    Musculoskeletal: Negative.    Skin: Negative.    Neurological: Negative.    Hematological: Negative.    Psychiatric/Behavioral: Negative.          Physical Exam  Vitals reviewed.   Constitutional:       Appearance: Normal appearance.   HENT:      Head: Normocephalic and atraumatic.      Mouth/Throat:      Mouth: Mucous membranes are moist.   Cardiovascular:      Rate and Rhythm: Normal rate and regular rhythm.      Pulses: Normal pulses.      Heart sounds: Normal heart sounds.   Pulmonary:      Effort: Pulmonary effort is normal.      Breath sounds: Normal breath sounds.   Musculoskeletal:         General: Normal range of motion.   Skin:     General: Skin is warm and dry.   Neurological:      General: No focal deficit present.      Mental Status: He is alert and oriented to person, place, and time.   Psychiatric:         Mood and Affect: Mood  "normal.         Behavior: Behavior normal.         Thought Content: Thought content normal.         Judgment: Judgment normal.          Last Recorded Vitals  Blood pressure 136/66, pulse 72, resp. rate 18, height 1.702 m (5' 7\"), weight 77.7 kg (171 lb 3.2 oz), SpO2 95%.    Relevant Results     Recent Results (from the past 35 weeks)   Comprehensive Metabolic Panel    Collection Time: 09/24/24  9:23 AM   Result Value Ref Range    Glucose 106 (H) 74 - 99 mg/dL    Sodium 139 136 - 145 mmol/L    Potassium 4.0 3.5 - 5.3 mmol/L    Chloride 106 98 - 107 mmol/L    Bicarbonate 25 21 - 32 mmol/L    Anion Gap 12 10 - 20 mmol/L    Urea Nitrogen 28 (H) 6 - 23 mg/dL    Creatinine 1.40 (H) 0.50 - 1.30 mg/dL    eGFR 50 (L) >60 mL/min/1.73m*2    Calcium 9.3 8.6 - 10.3 mg/dL    Albumin 4.3 3.4 - 5.0 g/dL    Alkaline Phosphatase 111 33 - 136 U/L    Total Protein 7.2 6.4 - 8.2 g/dL    AST 17 9 - 39 U/L    Bilirubin, Total 0.7 0.0 - 1.2 mg/dL    ALT 16 10 - 52 U/L   Hemoglobin A1C    Collection Time: 09/24/24  9:23 AM   Result Value Ref Range    Hemoglobin A1C 6.1 (H) See comment %    Estimated Average Glucose 128 Not Established mg/dL   Lipid Panel    Collection Time: 09/24/24  9:23 AM   Result Value Ref Range    Cholesterol 103 0 - 199 mg/dL    HDL-Cholesterol 28.4 mg/dL    Cholesterol/HDL Ratio 3.6     LDL Calculated 18 <=99 mg/dL    VLDL 57 (H) 0 - 40 mg/dL    Triglycerides 284 (H) 0 - 149 mg/dL    Non HDL Cholesterol 75 0 - 149 mg/dL         Assessment/Plan     85 y.o. male with medical history significant for CKD3, HTN, DMII, gout, GERD, HLD, nephrolithiasis, prostate cancer s/p radiation therapy, and overactive bladder who presents for a 6-month fuv for CKD3.    # CKD3: baseline sCr 1.4 - 1.7 mg/dL with eGFR 41- 50 ml/min/1.73m2. 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 Most recent sCr 1.40 mg/dL with " eGFR 50 ml/min/1.73m2 (24) - at baseline. Currently stable.    #HTN: well controlled with current regimen.    # DMII: well controlled. Most recent HgbA1c 6.1% (24).    # Gout: no recurrent flare-ups. On anti-gout agent.    # Nephrolithiasis: no recurrence since . Continue potassium citrate. Regular f/u with Urology.    # Prostate cancer: s/p radiation therapy and overactive bladder. f/u with Urology.    Plan:  - Labs.  - Continue to follow up with PCP for optimal DMII and HTN management. Goal HgbA1c < 7% and /80 mmHg for renal protection.   - Continue to follow up with urology.  - No changes with current medications.      - Advised regular home BP checks and keep a log to bring it to next visit. Target < 130/80 mmHg. To call providers if BPs are persistently > 130/80 mmHg.  - Reviewed strategies for preserving remaining kidney function includin) Avoidance of NSAIDs including Aleve (Naprosyn), Motrin (Ibuprofen), Mobic (Meloxicam), Celebrex (Celecoxib) and aspirin as well as PPI acid blocking medications such as Prilosec (omeprazole), Protonix (pantoprazole), and Nexium (esomeprazole), as well as other nephrotoxic agents.   2) Avoidance of tobacco or alcohol use.   3) Adequate hydration daily.   4) Blood pressure target 130/80 mmHg.   5) Daily dietary sodium intake less than 2 grams per day.    6) Maintain healthy lifestyle. Healthy diet and regular exercises.   7) Maintain ideal weight.  - FUV: in 6 months or sooner if concerns arise.     Patient verbalized understanding of above. He will not hesitate to contact Division of Nephrology at [phone] with concerns/questions.     I spent 20 minutes in the professional and overall care of this patient.      ABDULLAHI Ovalles-CNP

## 2025-04-27 LAB
25(OH)D3+25(OH)D2 SERPL-MCNC: 38 NG/ML (ref 30–100)
ALBUMIN SERPL-MCNC: 4.1 G/DL (ref 3.6–5.1)
ALBUMIN/CREAT UR: 361 MG/G CREAT
APPEARANCE UR: CLEAR
BACTERIA #/AREA URNS HPF: ABNORMAL /HPF
BILIRUB UR QL STRIP: NEGATIVE
BUN SERPL-MCNC: 35 MG/DL (ref 7–25)
BUN/CREAT SERPL: 24 (CALC) (ref 6–22)
CALCIUM SERPL-MCNC: 9.6 MG/DL (ref 8.6–10.3)
CHLORIDE SERPL-SCNC: 101 MMOL/L (ref 98–110)
CO2 SERPL-SCNC: 27 MMOL/L (ref 20–32)
COLOR UR: YELLOW
CREAT SERPL-MCNC: 1.48 MG/DL (ref 0.7–1.22)
CREAT UR-MCNC: 99 MG/DL (ref 20–320)
EGFRCR SERPLBLD CKD-EPI 2021: 46 ML/MIN/1.73M2
GLUCOSE SERPL-MCNC: 99 MG/DL (ref 65–99)
GLUCOSE UR QL STRIP: NEGATIVE
HGB UR QL STRIP: NEGATIVE
HYALINE CASTS #/AREA URNS LPF: ABNORMAL /LPF
KETONES UR QL STRIP: NEGATIVE
LEUKOCYTE ESTERASE UR QL STRIP: NEGATIVE
MICROALBUMIN UR-MCNC: 35.7 MG/DL
NITRITE UR QL STRIP: NEGATIVE
PH UR STRIP: 5.5 [PH] (ref 5–8)
PHOSPHATE SERPL-MCNC: 3.8 MG/DL (ref 2.1–4.3)
POTASSIUM SERPL-SCNC: 4.7 MMOL/L (ref 3.5–5.3)
PROT UR QL STRIP: ABNORMAL
PTH-INTACT SERPL-MCNC: 74 PG/ML (ref 16–77)
RBC #/AREA URNS HPF: ABNORMAL /HPF
SERVICE CMNT-IMP: ABNORMAL
SODIUM SERPL-SCNC: 137 MMOL/L (ref 135–146)
SP GR UR STRIP: 1.02 (ref 1–1.03)
SQUAMOUS #/AREA URNS HPF: ABNORMAL /HPF
WBC #/AREA URNS HPF: ABNORMAL /HPF

## 2025-06-13 ENCOUNTER — TELEPHONE (OUTPATIENT)
Dept: PRIMARY CARE | Facility: CLINIC | Age: 85
End: 2025-06-13
Payer: MEDICARE

## 2025-06-13 DIAGNOSIS — I10 ESSENTIAL HYPERTENSION: Primary | ICD-10-CM

## 2025-06-13 DIAGNOSIS — E78.5 HYPERLIPIDEMIA, UNSPECIFIED HYPERLIPIDEMIA TYPE: ICD-10-CM

## 2025-06-13 DIAGNOSIS — Z00.00 PHYSICAL EXAM: ICD-10-CM

## 2025-06-13 DIAGNOSIS — E11.65 TYPE 2 DIABETES MELLITUS WITH HYPERGLYCEMIA, UNSPECIFIED WHETHER LONG TERM INSULIN USE (MULTI): ICD-10-CM

## 2025-06-13 DIAGNOSIS — R35.1 NOCTURIA: ICD-10-CM

## 2025-06-13 DIAGNOSIS — E03.9 HYPOTHYROIDISM, UNSPECIFIED TYPE: ICD-10-CM

## 2025-06-17 RX ORDER — METRONIDAZOLE 7.5 MG/G
CREAM TOPICAL
COMMUNITY
Start: 2025-06-05

## 2025-06-17 RX ORDER — OXYBUTYNIN CHLORIDE 10 MG/1
TABLET, EXTENDED RELEASE ORAL
COMMUNITY
Start: 2025-03-21 | End: 2025-06-18 | Stop reason: SDUPTHER

## 2025-06-17 RX ORDER — ALLOPURINOL 100 MG/1
TABLET ORAL
COMMUNITY
Start: 2025-03-21

## 2025-06-17 RX ORDER — CLOBETASOL PROPIONATE 0.5 MG/G
OINTMENT TOPICAL
COMMUNITY
Start: 2025-06-06

## 2025-06-17 RX ORDER — ROFLUMILAST 3 MG/G
CREAM TOPICAL
COMMUNITY
Start: 2025-04-08

## 2025-06-18 ENCOUNTER — APPOINTMENT (OUTPATIENT)
Dept: PRIMARY CARE | Facility: CLINIC | Age: 85
End: 2025-06-18
Payer: MEDICARE

## 2025-06-18 VITALS
HEIGHT: 66 IN | HEART RATE: 87 BPM | SYSTOLIC BLOOD PRESSURE: 118 MMHG | DIASTOLIC BLOOD PRESSURE: 67 MMHG | BODY MASS INDEX: 27.64 KG/M2 | WEIGHT: 172 LBS | TEMPERATURE: 98 F | OXYGEN SATURATION: 94 %

## 2025-06-18 DIAGNOSIS — E11.65 TYPE 2 DIABETES MELLITUS WITH HYPERGLYCEMIA, WITHOUT LONG-TERM CURRENT USE OF INSULIN: ICD-10-CM

## 2025-06-18 DIAGNOSIS — N13.8 BPH WITH OBSTRUCTION/LOWER URINARY TRACT SYMPTOMS: ICD-10-CM

## 2025-06-18 DIAGNOSIS — N40.1 BPH WITH OBSTRUCTION/LOWER URINARY TRACT SYMPTOMS: ICD-10-CM

## 2025-06-18 DIAGNOSIS — N18.32 CHRONIC KIDNEY DISEASE, STAGE 3B (MULTI): ICD-10-CM

## 2025-06-18 DIAGNOSIS — K21.9 GASTROESOPHAGEAL REFLUX DISEASE WITHOUT ESOPHAGITIS: ICD-10-CM

## 2025-06-18 DIAGNOSIS — Z00.00 ROUTINE GENERAL MEDICAL EXAMINATION AT HEALTH CARE FACILITY: ICD-10-CM

## 2025-06-18 DIAGNOSIS — R53.83 FATIGUE, UNSPECIFIED TYPE: ICD-10-CM

## 2025-06-18 DIAGNOSIS — N18.30 TYPE 2 DIABETES MELLITUS WITH STAGE 3 CHRONIC KIDNEY DISEASE, WITHOUT LONG-TERM CURRENT USE OF INSULIN, UNSPECIFIED WHETHER STAGE 3A OR 3B CKD (MULTI): ICD-10-CM

## 2025-06-18 DIAGNOSIS — E78.5 HYPERLIPIDEMIA, UNSPECIFIED HYPERLIPIDEMIA TYPE: ICD-10-CM

## 2025-06-18 DIAGNOSIS — I10 ESSENTIAL HYPERTENSION: ICD-10-CM

## 2025-06-18 DIAGNOSIS — G63 POLYNEUROPATHY ASSOCIATED WITH UNDERLYING DISEASE: ICD-10-CM

## 2025-06-18 DIAGNOSIS — M1A.09X1 IDIOPATHIC CHRONIC GOUT OF MULTIPLE SITES WITH TOPHUS: ICD-10-CM

## 2025-06-18 DIAGNOSIS — N20.0 NEPHROLITHIASIS: ICD-10-CM

## 2025-06-18 DIAGNOSIS — Z00.00 MEDICARE ANNUAL WELLNESS VISIT, SUBSEQUENT: Primary | ICD-10-CM

## 2025-06-18 DIAGNOSIS — E11.22 TYPE 2 DIABETES MELLITUS WITH STAGE 3 CHRONIC KIDNEY DISEASE, WITHOUT LONG-TERM CURRENT USE OF INSULIN, UNSPECIFIED WHETHER STAGE 3A OR 3B CKD (MULTI): ICD-10-CM

## 2025-06-18 PROBLEM — N18.2: Status: RESOLVED | Noted: 2023-07-20 | Resolved: 2025-06-18

## 2025-06-18 PROBLEM — E08.22: Status: RESOLVED | Noted: 2023-07-20 | Resolved: 2025-06-18

## 2025-06-18 LAB
ALBUMIN SERPL-MCNC: 4.6 G/DL (ref 3.6–5.1)
ALBUMIN/CREAT UR: 793 MG/G CREAT
ALP SERPL-CCNC: 136 U/L (ref 35–144)
ALT SERPL-CCNC: 13 U/L (ref 9–46)
ANION GAP SERPL CALCULATED.4IONS-SCNC: 10 MMOL/L (CALC) (ref 7–17)
AST SERPL-CCNC: 16 U/L (ref 10–35)
BASOPHILS # BLD AUTO: 48 CELLS/UL (ref 0–200)
BASOPHILS NFR BLD AUTO: 0.7 %
BILIRUB SERPL-MCNC: 0.7 MG/DL (ref 0.2–1.2)
BUN SERPL-MCNC: 37 MG/DL (ref 7–25)
CALCIUM SERPL-MCNC: 9.8 MG/DL (ref 8.6–10.3)
CHLORIDE SERPL-SCNC: 104 MMOL/L (ref 98–110)
CHOLEST SERPL-MCNC: 121 MG/DL
CHOLEST/HDLC SERPL: 3.9 (CALC)
CO2 SERPL-SCNC: 26 MMOL/L (ref 20–32)
CREAT SERPL-MCNC: 1.53 MG/DL (ref 0.7–1.22)
CREAT UR-MCNC: 121 MG/DL (ref 20–320)
EGFRCR SERPLBLD CKD-EPI 2021: 45 ML/MIN/1.73M2
EOSINOPHIL # BLD AUTO: 238 CELLS/UL (ref 15–500)
EOSINOPHIL NFR BLD AUTO: 3.5 %
ERYTHROCYTE [DISTWIDTH] IN BLOOD BY AUTOMATED COUNT: 14.5 % (ref 11–15)
EST. AVERAGE GLUCOSE BLD GHB EST-MCNC: 131 MG/DL
EST. AVERAGE GLUCOSE BLD GHB EST-SCNC: 7.3 MMOL/L
GLUCOSE SERPL-MCNC: 102 MG/DL (ref 65–99)
HBA1C MFR BLD: 6.2 %
HCT VFR BLD AUTO: 44.3 % (ref 38.5–50)
HDLC SERPL-MCNC: 31 MG/DL
HGB BLD-MCNC: 14.4 G/DL (ref 13.2–17.1)
LDLC SERPL CALC-MCNC: 58 MG/DL (CALC)
LYMPHOCYTES # BLD AUTO: 1251 CELLS/UL (ref 850–3900)
LYMPHOCYTES NFR BLD AUTO: 18.4 %
MCH RBC QN AUTO: 29 PG (ref 27–33)
MCHC RBC AUTO-ENTMCNC: 32.5 G/DL (ref 32–36)
MCV RBC AUTO: 89.1 FL (ref 80–100)
MICROALBUMIN UR-MCNC: 95.9 MG/DL
MONOCYTES # BLD AUTO: 449 CELLS/UL (ref 200–950)
MONOCYTES NFR BLD AUTO: 6.6 %
NEUTROPHILS # BLD AUTO: 4814 CELLS/UL (ref 1500–7800)
NEUTROPHILS NFR BLD AUTO: 70.8 %
NONHDLC SERPL-MCNC: 90 MG/DL (CALC)
PLATELET # BLD AUTO: 206 THOUSAND/UL (ref 140–400)
PMV BLD REES-ECKER: 10.3 FL (ref 7.5–12.5)
POTASSIUM SERPL-SCNC: 4.3 MMOL/L (ref 3.5–5.3)
PROT SERPL-MCNC: 7.2 G/DL (ref 6.1–8.1)
PSA SERPL-MCNC: <0.04 NG/ML
RBC # BLD AUTO: 4.97 MILLION/UL (ref 4.2–5.8)
SODIUM SERPL-SCNC: 140 MMOL/L (ref 135–146)
T4 FREE SERPL-MCNC: 1 NG/DL (ref 0.8–1.8)
TRIGL SERPL-MCNC: 308 MG/DL
TSH SERPL-ACNC: 4.9 MIU/L (ref 0.4–4.5)
WBC # BLD AUTO: 6.8 THOUSAND/UL (ref 3.8–10.8)

## 2025-06-18 PROCEDURE — 3074F SYST BP LT 130 MM HG: CPT | Performed by: FAMILY MEDICINE

## 2025-06-18 PROCEDURE — G0439 PPPS, SUBSEQ VISIT: HCPCS | Performed by: FAMILY MEDICINE

## 2025-06-18 PROCEDURE — 1159F MED LIST DOCD IN RCRD: CPT | Performed by: FAMILY MEDICINE

## 2025-06-18 PROCEDURE — 99214 OFFICE O/P EST MOD 30 MIN: CPT | Performed by: FAMILY MEDICINE

## 2025-06-18 PROCEDURE — 1126F AMNT PAIN NOTED NONE PRSNT: CPT | Performed by: FAMILY MEDICINE

## 2025-06-18 PROCEDURE — 1036F TOBACCO NON-USER: CPT | Performed by: FAMILY MEDICINE

## 2025-06-18 PROCEDURE — 3078F DIAST BP <80 MM HG: CPT | Performed by: FAMILY MEDICINE

## 2025-06-18 PROCEDURE — 1160F RVW MEDS BY RX/DR IN RCRD: CPT | Performed by: FAMILY MEDICINE

## 2025-06-18 PROCEDURE — 99397 PER PM REEVAL EST PAT 65+ YR: CPT | Performed by: FAMILY MEDICINE

## 2025-06-18 PROCEDURE — 1170F FXNL STATUS ASSESSED: CPT | Performed by: FAMILY MEDICINE

## 2025-06-18 RX ORDER — ROSUVASTATIN CALCIUM 20 MG/1
20 TABLET, COATED ORAL DAILY
Qty: 90 TABLET | Refills: 1 | Status: SHIPPED | OUTPATIENT
Start: 2025-06-18 | End: 2025-12-15

## 2025-06-18 RX ORDER — OXYBUTYNIN CHLORIDE 10 MG/1
10 TABLET, EXTENDED RELEASE ORAL DAILY
Qty: 90 TABLET | Refills: 1 | Status: SHIPPED | OUTPATIENT
Start: 2025-06-18

## 2025-06-18 RX ORDER — AMLODIPINE BESYLATE 5 MG/1
5 TABLET ORAL DAILY
Qty: 90 TABLET | Refills: 1 | Status: SHIPPED | OUTPATIENT
Start: 2025-06-18 | End: 2025-12-15

## 2025-06-18 RX ORDER — GABAPENTIN 300 MG/1
300 CAPSULE ORAL NIGHTLY
Start: 2025-06-18

## 2025-06-18 RX ORDER — OXYBUTYNIN CHLORIDE 10 MG/1
10 TABLET, EXTENDED RELEASE ORAL DAILY
Qty: 90 TABLET | Refills: 1 | Status: SHIPPED | OUTPATIENT
Start: 2025-06-18 | End: 2025-06-18 | Stop reason: SDUPTHER

## 2025-06-18 RX ORDER — POTASSIUM CITRATE 1080 MG/1
10 TABLET, EXTENDED RELEASE ORAL
Qty: 180 TABLET | Refills: 1 | Status: SHIPPED | OUTPATIENT
Start: 2025-06-18

## 2025-06-18 RX ORDER — FAMOTIDINE 20 MG/1
20 TABLET, FILM COATED ORAL DAILY
Qty: 90 TABLET | Refills: 1 | Status: SHIPPED | OUTPATIENT
Start: 2025-06-18 | End: 2025-12-15

## 2025-06-18 ASSESSMENT — PATIENT HEALTH QUESTIONNAIRE - PHQ9
SUM OF ALL RESPONSES TO PHQ9 QUESTIONS 1 AND 2: 0
1. LITTLE INTEREST OR PLEASURE IN DOING THINGS: NOT AT ALL
2. FEELING DOWN, DEPRESSED OR HOPELESS: NOT AT ALL

## 2025-06-18 ASSESSMENT — ACTIVITIES OF DAILY LIVING (ADL)
DOING_HOUSEWORK: INDEPENDENT
BATHING: INDEPENDENT
DRESSING: INDEPENDENT
GROCERY_SHOPPING: INDEPENDENT
TAKING_MEDICATION: INDEPENDENT
MANAGING_FINANCES: INDEPENDENT

## 2025-06-18 ASSESSMENT — ANXIETY QUESTIONNAIRES
IF YOU CHECKED OFF ANY PROBLEMS ON THIS QUESTIONNAIRE, HOW DIFFICULT HAVE THESE PROBLEMS MADE IT FOR YOU TO DO YOUR WORK, TAKE CARE OF THINGS AT HOME, OR GET ALONG WITH OTHER PEOPLE: NOT DIFFICULT AT ALL
3. WORRYING TOO MUCH ABOUT DIFFERENT THINGS: NOT AT ALL
4. TROUBLE RELAXING: NOT AT ALL
5. BEING SO RESTLESS THAT IT IS HARD TO SIT STILL: NOT AT ALL
1. FEELING NERVOUS, ANXIOUS, OR ON EDGE: NOT AT ALL
6. BECOMING EASILY ANNOYED OR IRRITABLE: NOT AT ALL
2. NOT BEING ABLE TO STOP OR CONTROL WORRYING: NOT AT ALL
GAD7 TOTAL SCORE: 0
7. FEELING AFRAID AS IF SOMETHING AWFUL MIGHT HAPPEN: NOT AT ALL

## 2025-06-18 ASSESSMENT — ENCOUNTER SYMPTOMS
DEPRESSION: 0
OCCASIONAL FEELINGS OF UNSTEADINESS: 0
LOSS OF SENSATION IN FEET: 0

## 2025-06-18 ASSESSMENT — PAIN SCALES - GENERAL: PAINLEVEL_OUTOF10: 0-NO PAIN

## 2025-06-18 NOTE — PROGRESS NOTES
Subjective   Reason for Visit: Chilo Gibbs is an 85 y.o. male here for a Medicare Wellness visit.     Past Medical, Surgical, and Family History reviewed and updated in chart.    Reviewed all medications by prescribing practitioner or clinical pharmacist (such as prescriptions, OTCs, herbal therapies and supplements) and documented in the medical record.  Medicare Wellness Billing Compliance Satisfied    *This is a visual tool to show completion of required items on the day of the visit. Green checks will only appear on the date of visit.    Review all medications by prescribing practitioner or clinical pharmacist (such as prescriptions, OTCs, herbal therapies and supplements) documented in the medical record    Past Medical, Surgical, and Family History reviewed and updated in chart    Tobacco Use Reviewed    Alcohol Use Reviewed    Illicit Drug Use Reviewed    PHQ2/9    Falls in Last Year Reviewed    Home Safety Risk Factors Reviewed    Cognitive Impairment Reviewed    Patient Self Assessment and Health Status    Current Diet Reviewed    Exercise Frequency    ADL - Hearing Impairment    ADL - Bathing    ADL - Dressing    ADL - Walks in Home    IADL - Managing Finances    IADL - Grocery Shopping    IADL - Taking Medications    IADL - Doing Housework      HPI  Patient is in the office today for a follow-up.  He would like to discuss his lab results from 6/17/2025.     He reports that he is having some changes on his bowel movements and because of that is having follow-ups with gastroenterologist Dr. Wolfe. He will have a new appointment tomorrow.     His blood pressure (118/67) was within good range when checked in the clinic today. He continues on amlodipine 5 mg daily for treatment of hypertension. He denies any side-effects to his amlodipine medication. He denies any chest pain and/or SOB symptoms.     He takes rosuvastatin 20 mg daily for treatment of hyperlipidemia. He is  "tolerating this dose of rosuvastatin medication well. He denies any side-effects to his rosuvastatin medication. His last lipid panel from 6/17/2025 showed: , HDL 31, Triglycerides 308, LDL 58.  He states eating a lot of sweets, TG high    He takes allopurinol 100 mg daily for treatment of chronic gout (multiple sites). His allopurinol medication is working well to control his gout symptoms. No gout attacks    He is on famotidine 20 mg daily for treatment of GERD. He states that he is not having  acid reflux despite that he is using famotidine if he eats late at night.     He continues on gabapentin 300 mg daily for treatment of polyneuropathy, only taking 300 mg at night now since had epidural for his back pain w Dr Silva. He takes oxybutynin XL 10 mg daily for treatment of BPH. He is on potassium citrate CR 10 mEq supplement daily for treatment/prevention of kidney stones. He denies any side-effects to any of these medications.      Patient Care Team:  Sandra Daniel DO as PCP - [facility] [employer] P[facility]     Review of Systems    Objective   Vitals:  /67 (BP Location: Left arm, Patient Position: Sitting, BP Cuff Size: Adult)   Pulse 87   Temp 36.7 °C (98 °F) (Temporal)   Ht 1.676 m (5' 6\")   Wt 78 kg (172 lb)   SpO2 94%   BMI 27.76 kg/m²       Physical Exam  Constitutional:       Appearance: Normal appearance.   HENT:      Head: Normocephalic.      Right Ear: Tympanic membrane normal.      Left Ear: Tympanic membrane normal.      Ears:      Comments: Has bilateral hearing aids     Mouth/Throat:      Pharynx: Oropharynx is clear.   Neck:      Vascular: No carotid bruit.   Cardiovascular:      Rate and Rhythm: Normal rate and regular rhythm.      Pulses: Normal pulses.      Heart sounds: Normal heart sounds.   Pulmonary:      Effort: Pulmonary effort is normal.      Breath sounds: Normal breath sounds.   Abdominal:      General: Bowel sounds are normal.      Palpations: Abdomen is " soft. There is no mass.      Tenderness: There is no abdominal tenderness.   Musculoskeletal:         General: Normal range of motion.      Cervical back: Normal range of motion.      Right lower leg: No edema.      Left lower leg: No edema.   Lymphadenopathy:      Cervical: No cervical adenopathy.   Skin:     General: Skin is warm and dry.      Findings: No rash.   Neurological:      Mental Status: He is alert and oriented to person, place, and time.   Psychiatric:         Mood and Affect: Mood normal.         Behavior: Behavior normal.         Thought Content: Thought content normal.         Judgment: Judgment normal.         Assessment & Plan  Medicare annual wellness visit, subsequent         Routine general medical examination at health care facility  Discussed lab results from 6/17/2025 which showed: PSA less than 0.04, TSH 4.90, , HDL 31, Triglycerides 308, LDL 58, A1C 6.2%, glucose 102, creatinine 1.53, GFR 45.   Advised to perform urine drug screen test today at the office. Pt unable to give urine, will do future appt  Recommended lab tests prior to follow-up in 6 months.  Medications refilled today. Instructed to take medications as prescribed. CSA reviewed and signed today.   Pt brought in letter that cost of potassium citrate and oxybutinin will go up and asking for substitutions, meds from urology and he will follow up w his urologist for this  OARRS reviewed, diversion addiction and efficacy of medication considered along with risks and benefits and will continue patient on medication.  Follow-up in 6 months, call us if needed sooner.        Essential hypertension  Recommended continue with antihypertensive medications as prescribed.   Orders:    amLODIPine (Norvasc) 5 mg tablet; Take 1 tablet (5 mg) by mouth once daily.    CBC and Auto Differential; Future    Gastroesophageal reflux disease without esophagitis  Recommended continue with famotidine 20 mg once daily.   Orders:    famotidine  (Pepcid) 20 mg tablet; Take 1 tablet (20 mg) by mouth once daily.    Polyneuropathy associated with underlying disease  Recommended continue with gabapentin 300 mg once daily.   Orders:    gabapentin (Neurontin) 300 mg capsule; Take 1 capsule (300 mg) by mouth once daily at bedtime.    Hyperlipidemia, unspecified hyperlipidemia type  Recommended continue with rosuvastatin 20 mg once daily.   Orders:    rosuvastatin (Crestor) 20 mg tablet; Take 1 tablet (20 mg) by mouth once daily.    Comprehensive Metabolic Panel; Future    Lipid Panel; Future    BPH with obstruction/lower urinary tract symptoms  Recommended continue with oxybutynin 10 mg once daily.   Orders:    oxyBUTYnin XL (Ditropan-XL) 10 mg 24 hr tablet; Take 1 tablet (10 mg) by mouth once daily. Do not crush, chew, or split. Take at same time each day.    Nephrolithiasis  Recommended continue with potassium citrate as prescribed.   Orders:    potassium citrate CR (Urocit-K-10) 10 mEq ER tablet; Take 1 tablet (10 mEq) by mouth every 12 hours.    Idiopathic chronic gout of multiple sites with tophus  Will monitor with new lab tests.   Orders:    Uric acid; Future    Type 2 diabetes mellitus with hyperglycemia, without long-term current use of insulin  Will monitor with new lab tests.   Orders:    Hemoglobin A1C; Future    Fatigue, unspecified type  Will monitor with new lab tests.   Orders:    TSH with reflex to Free T4 if abnormal; Future    Discussed lab results from 6/17/2025 which showed: PSA less than 0.04, TSH 4.90, , HDL 31, Triglycerides 308, LDL 58, A1C 6.2%, glucose 102, creatinine 1.53, GFR 45.   Advised to perform urine drug screen test today at the office.   Recommended lab tests prior to follow-up in 6 months.  Medications refilled today. Instructed to take medications as prescribed. CSA reviewed and signed today.   OARRS reviewed, diversion addiction and efficacy of medication considered along with risks and benefits and will continue  patient on medication.  Follow-up in 6 months, call us if needed sooner.         Scribe Attestation  By signing my name below, I, Cedric Tellez   attest that this documentation has been prepared under the direction and in the presence of Sandra Daniel DO.

## 2025-06-18 NOTE — ASSESSMENT & PLAN NOTE
Recommended continue with oxybutynin 10 mg once daily.   Orders:    oxyBUTYnin XL (Ditropan-XL) 10 mg 24 hr tablet; Take 1 tablet (10 mg) by mouth once daily. Do not crush, chew, or split. Take at same time each day.

## 2025-06-18 NOTE — ASSESSMENT & PLAN NOTE
Recommended continue with famotidine 20 mg once daily.   Orders:    famotidine (Pepcid) 20 mg tablet; Take 1 tablet (20 mg) by mouth once daily.

## 2025-06-18 NOTE — ASSESSMENT & PLAN NOTE
Recommended continue with antihypertensive medications as prescribed.   Orders:    amLODIPine (Norvasc) 5 mg tablet; Take 1 tablet (5 mg) by mouth once daily.    CBC and Auto Differential; Future

## 2025-06-18 NOTE — ASSESSMENT & PLAN NOTE
Recommended continue with potassium citrate as prescribed.   Orders:    potassium citrate CR (Urocit-K-10) 10 mEq ER tablet; Take 1 tablet (10 mEq) by mouth every 12 hours.

## 2025-06-18 NOTE — ASSESSMENT & PLAN NOTE
Recommended continue with gabapentin 300 mg once daily.   Orders:    gabapentin (Neurontin) 300 mg capsule; Take 1 capsule (300 mg) by mouth once daily at bedtime.

## 2025-06-18 NOTE — ASSESSMENT & PLAN NOTE
Recommended continue with rosuvastatin 20 mg once daily.   Orders:    rosuvastatin (Crestor) 20 mg tablet; Take 1 tablet (20 mg) by mouth once daily.    Comprehensive Metabolic Panel; Future    Lipid Panel; Future

## 2025-06-19 ENCOUNTER — APPOINTMENT (OUTPATIENT)
Dept: GASTROENTEROLOGY | Facility: CLINIC | Age: 85
End: 2025-06-19
Payer: MEDICARE

## 2025-06-19 VITALS — HEART RATE: 76 BPM | HEIGHT: 66 IN | BODY MASS INDEX: 27.64 KG/M2 | WEIGHT: 172 LBS

## 2025-06-19 DIAGNOSIS — R15.2 FECAL URGENCY: Primary | ICD-10-CM

## 2025-06-19 DIAGNOSIS — R19.5 LOOSE STOOLS: ICD-10-CM

## 2025-06-19 DIAGNOSIS — R15.9 INCONTINENCE OF FECES, UNSPECIFIED FECAL INCONTINENCE TYPE: ICD-10-CM

## 2025-06-19 PROCEDURE — 1159F MED LIST DOCD IN RCRD: CPT | Performed by: INTERNAL MEDICINE

## 2025-06-19 PROCEDURE — 99214 OFFICE O/P EST MOD 30 MIN: CPT | Performed by: INTERNAL MEDICINE

## 2025-06-19 PROCEDURE — 1036F TOBACCO NON-USER: CPT | Performed by: INTERNAL MEDICINE

## 2025-06-19 ASSESSMENT — ENCOUNTER SYMPTOMS: SHORTNESS OF BREATH: 0

## 2025-06-19 NOTE — PATIENT INSTRUCTIONS
Continue Benefiber. Avoid or minimize very fatty or oily foods especially when out of the house. Ok to use Imodium 2-4 mg daily as needed prior to social outings, travel, or eating at a restaurant. Follow-up in the office as needed.

## 2025-06-19 NOTE — PROGRESS NOTES
REASON FOR VISIT:  Fecal urgency  PCP (requesting provider): No primary care provider on file..    HPI:  Chilo Gibbs is a 85 y.o. 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 Colonoscopy showed single TA, diverticulosis, hemorrhoids, and radiation proctitis (1/2022). CT A/P w/out contrast showed cholelithiasis, umbilical hernia, diverticulosis, and small inguinal hernias (4/2024). He has been struggling with loose stools and fecal urgency.    The patient reports he has had good days and bad days. He started Benefiber and was using it three times per day but has weaned down to twice daily over time. He reports he did nothing the last two days as bowel habits were better. He notes that he has had three accidents related to post-prandial fecal urgency. He notes he had a 3 week vacation to [place] and had no issues and did take Benefiber there. He was also in [place] without any issues. No issues in the last 3 weeks. Prior to Benefiber, he was having 5 bowel movements per day. Since being on Benefiber, he is now having two formed bowel movements per day. No blood in the stool. He has changed diet to remove colón, changed from ham to turkey, he avoids drinking cold water, and avoiding fatty and oily foods and dressings.    PSurgHx:  -Prostatectomy   -Right inguinal hernia repair      FamHx: No GI cancer     Prior Endoscopy:  -Colonoscopy (1/2022): Excellent prep, normal TI, 4 mm ascending polyp (TA), severe sigmoid diverticulosis, small IH/EH, mild radiation proctitis in distal rectum, otherwise normal colon.  -Colonoscopy (4/2016): Normal TI, mild descending and sigmoid diverticulosis, abnormal vascularity in the distal rectum consistent with radiation proctitis, otherwise normal (no biopsy results available).  -Colonoscopy (9/2013):  Moderate descending and sigmoid diverticulosis, abnormal vascularity in the rectum consistent with radiation proctitis.     PAST MEDICAL HISTORY  Medical History[1]    PAST SURGICAL HISTORY  Surgical History[2]    FAMILY HISTORY  Family History[3]    SOCIAL HISTORY   reports that he has never smoked. He has never used smokeless tobacco. He reports that he does not currently use alcohol. He reports that he does not use drugs.    REVIEW OF SYSTEMS  Review of Systems   Respiratory:  Negative for shortness of breath.    Cardiovascular:  Negative for chest pain.   All other systems reviewed and are negative.    A 10+ point review of systems was otherwise negative except as noted and per HPI.    ALLERGIES  Allergies[4]    MEDICATIONS  Current Outpatient Medications   Medication Instructions    acitretin (Soriatane) 17.5 mg capsule 1 Capsule    allopurinol (Zyloprim) 100 mg tablet     amLODIPine (NORVASC) 5 mg, oral, Daily    calcipotriene (Dovonex) 0.005 % cream Use as needed to affected area    clobetasol (Temovate) 0.05 % external solution Topical, Daily    clobetasol (Temovate) 0.05 % ointment APPLY TOPICALLY TO THE AFFECTED AREA TWICE DAILY. NO MORE THAN 2 WEEKS    famotidine (PEPCID) 20 mg, oral, Daily    gabapentin (NEURONTIN) 300 mg, oral, Nightly    ketoconazole (NIZOral) 2 % shampoo Use 2-3x a week. Leave on for 5 min before rinsing.    lancets (OneTouch Delica Plus Lancet) 30 gauge misc Test bs once daily    metroNIDAZOLE (Metrocream) 0.75 % cream APPLY THIN LAYER TOPICALLY TO FACE TWICE DAILY IN THE MORNING AND AT NIGHT AFTER WASHING    OneTouch Verio Flex meter misc TO TEST BLOOD SUGAR    OneTouch Verio test strips strip TEST BLOOD SUGAR ONCE A DAY    oxyBUTYnin XL (DITROPAN-XL) 10 mg, oral, Daily, Do not crush, chew, or split. Take at same time each day.    potassium citrate CR (Urocit-K-10) 10 mEq ER tablet 10 mEq, oral, Every 12 hours scheduled (0630,1830)    rosuvastatin (CRESTOR) 20 mg, oral, Daily     Zoryve 0.3 % cream        VITALS  Vitals:    06/19/25 1451   Pulse: 76      Body mass index is 27.76 kg/m².    PHYSICAL EXAM  CONSTITUTIONAL: NAD, appears stated age  EYES: anicteric sclera, sclera clear  HEAD: normocephalic, atraumatic   NECK: supple   PULMONARY: CTAB  CARDIOVASCULAR: RRR, no M/R/G appreciated   ABDOMEN: soft, NTND, +BS, no rebound or guarding   MUSCULOSKELETAL: no edema  SKIN: no jaundice   PSYCHIATRIC: AOx3, appropriate insight and judgement    LABS  WBC   Date Value   04/15/2024 5.6 x10*3/uL   04/17/2023 8.5 x10E9/L   01/30/2023 7.2 x10E9/L   06/24/2022 5.8 x10E9/L     WHITE BLOOD CELL COUNT (Thousand/uL)   Date Value   06/17/2025 6.8     Hemoglobin (g/dL)   Date Value   04/15/2024 15.2   04/17/2023 14.3   01/30/2023 14.5   06/24/2022 14.9     HEMOGLOBIN (g/dL)   Date Value   06/17/2025 14.4     Platelets   Date Value   04/15/2024 142 x10*3/uL (L)   04/17/2023 256 x10E9/L   01/30/2023 266 x10E9/L   06/24/2022 270 x10E9/L     PLATELET COUNT (Thousand/uL)   Date Value   06/17/2025 206     SODIUM (mmol/L)   Date Value   06/17/2025 140   04/25/2025 137     Sodium (mmol/L)   Date Value   09/24/2024 139   07/02/2024 139   04/15/2024 136     POTASSIUM (mmol/L)   Date Value   06/17/2025 4.3   04/25/2025 4.7     Potassium (mmol/L)   Date Value   09/24/2024 4.0   07/02/2024 4.4   04/15/2024 3.7     CHLORIDE (mmol/L)   Date Value   06/17/2025 104   04/25/2025 101     Chloride (mmol/L)   Date Value   09/24/2024 106   07/02/2024 104   04/15/2024 104     CARBON DIOXIDE (mmol/L)   Date Value   06/17/2025 26   04/25/2025 27     Bicarbonate (mmol/L)   Date Value   09/24/2024 25   07/02/2024 26   04/15/2024 23     UREA NITROGEN (BUN) (mg/dL)   Date Value   06/17/2025 37 (H)   04/25/2025 35 (H)     Urea Nitrogen (mg/dL)   Date Value   09/24/2024 28 (H)   07/02/2024 29 (H)   04/15/2024 34 (H)     Creatinine (mg/dL)   Date Value   09/24/2024 1.40 (H)   07/02/2024 1.45 (H)   04/15/2024 1.50 (H)     CREATININE (mg/dL)  "  Date Value   06/17/2025 1.53 (H)   04/25/2025 1.48 (H)     CALCIUM (mg/dL)   Date Value   06/17/2025 9.8   04/25/2025 9.6     Calcium (mg/dL)   Date Value   09/24/2024 9.3   07/02/2024 9.7   04/15/2024 8.3 (L)     PROTEIN, TOTAL (g/dL)   Date Value   06/17/2025 7.2     Total Protein (g/dL)   Date Value   09/24/2024 7.2   07/02/2024 7.3   04/02/2024 6.9     BILIRUBIN, TOTAL (mg/dL)   Date Value   06/17/2025 0.7     Bilirubin, Total (mg/dL)   Date Value   09/24/2024 0.7   07/02/2024 0.9   04/02/2024 0.9     ALKALINE PHOSPHATASE (U/L)   Date Value   06/17/2025 136     Alkaline Phosphatase (U/L)   Date Value   09/24/2024 111   07/02/2024 113   04/02/2024 103     ALT (U/L)   Date Value   06/17/2025 13   09/24/2024 16   07/02/2024 19   04/02/2024 17     AST (U/L)   Date Value   06/17/2025 16   09/24/2024 17   07/02/2024 20   04/02/2024 16     GLUCOSE (mg/dL)   Date Value   06/17/2025 102 (H)   04/25/2025 99     Glucose (mg/dL)   Date Value   09/24/2024 106 (H)   07/02/2024 106 (H)   04/15/2024 116 (H)     No results found for: \"LIPASE\", \"CRP\"    ASSESSMENT/PLAN  Chilo Gibbs is a 85 y.o. 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 Colonoscopy showed single TA, diverticulosis, hemorrhoids, and radiation proctitis (1/2022). CT A/P w/out contrast showed cholelithiasis, umbilical hernia, diverticulosis, and small inguinal hernias (4/2024). He has been struggling with loose stools and fecal urgency. His bowel habits are actually much improved on Benefiber. He has still had a few sporadic episodes of fecal urgency and incontinence while out of the house. These episodes seem to be after times of fatty and oily meals. We will continue with the Benefiber and utilize Imodium as needed for outings. The patient will also work on dietary " modifications.    -Continue Benefiber BID   -Ok to use Imodium 2-4 mg daily PRN prior to eating out, travel, or social outings   -Advised patient to work on dietary modifications including avoiding very fatty or oily foods (he has already cut down on coffee and drinks decaf)    Follow-up in the office PRN.    Signature: Jm Wolfe MD       [1]   Past Medical History:  Diagnosis Date    Noninfective gastroenteritis and colitis, unspecified     Acute colitis    Other conditions influencing health status     Cataract Fragments In Both Eyes After Cataract Surgery    Other conditions influencing health status     Prostate Cancer    Other conditions influencing health status     Nephrolithiasis    Personal history of other specified conditions     History of heartburn    Personal history of other specified conditions 12/10/2021    History of elevated prostate specific antigen (PSA)   [2]   Past Surgical History:  Procedure Laterality Date    COLONOSCOPY  10/01/2013    Complete Colonoscopy    OTHER SURGICAL HISTORY  10/01/2013    Needle/Cath Placement For Brachyther Applic In Pelvic Organs    OTHER SURGICAL HISTORY  10/01/2013    Radiation Therapy    OTHER SURGICAL HISTORY  10/01/2013    Prostatectomy, Perineal Radical With Lymph Node Biopsy(S)    SHOULDER SURGERY  10/01/2013    Shoulder Surgery   [3]   Family History  Problem Relation Name Age of Onset    Prostate cancer Other     [4] No Known Allergies

## 2025-09-19 ENCOUNTER — APPOINTMENT (OUTPATIENT)
Facility: CLINIC | Age: 85
End: 2025-09-19
Payer: MEDICARE

## 2025-11-26 ENCOUNTER — APPOINTMENT (OUTPATIENT)
Dept: PRIMARY CARE | Facility: CLINIC | Age: 85
End: 2025-11-26
Payer: MEDICARE